# Patient Record
Sex: MALE | Race: WHITE | NOT HISPANIC OR LATINO | Employment: FULL TIME | URBAN - METROPOLITAN AREA
[De-identification: names, ages, dates, MRNs, and addresses within clinical notes are randomized per-mention and may not be internally consistent; named-entity substitution may affect disease eponyms.]

---

## 2021-09-29 NOTE — PROGRESS NOTES
"    Assessment & Plan    1. Encounter to establish care    2. Gastroesophageal reflux disease without esophagitis  - Adult General Surg Referral    3. Insomnia, unspecified type  - SLEEP EVALUATION & MANAGEMENT REFERRAL - ADULT -; Future    4. Impaired fasting glucose  - Hemoglobin A1c; Future  - Hemoglobin A1c    5. Hypertriglyceridemia  - Lipid Profile (Chol, Trig, HDL, LDL calc)    6. Family history of diabetes mellitus  - Hemoglobin A1c; Future  - Comprehensive metabolic panel (BMP + Alb, Alk Phos, ALT, AST, Total. Bili, TP); Future  - Hemoglobin A1c  - Comprehensive metabolic panel (BMP + Alb, Alk Phos, ALT, AST, Total. Bili, TP)    7. Alcoholism (H)  - Lipid Profile (Chol, Trig, HDL, LDL calc)  - Hemoglobin A1c; Future  - Hemoglobin A1c      Review of prior external note(s) from - CareEverywhere information from last family practice visit at [facility] reviewed  Ordering of each unique test  Prescription drug management  25 minutes spent on the date of the encounter doing review of outside records, patient visit and documentation        Tobacco Cessation:   reports that he has been smoking cigarettes. He has smoked for the past 26.00 years. He has never used smokeless tobacco.  Tobacco Cessation Action Plan: Information offered: Patient not interested at this time    BMI:   Estimated body mass index is 29.27 kg/m  as calculated from the following:    Height as of this encounter: 1.778 m (5' 10\").    Weight as of this encounter: 92.5 kg (204 lb).   Weight management plan: Discussed healthy diet and exercise guidelines        Return in about 6 months (around 3/30/2022).    Estelle Reyna, CNP  [facility] [place] is a 41 year old who presents for the following health issues     HPI     New Patient/Transfer of Care    Transferring from [facility]. Previously in [place] before that    Allergic Rhinitis: Did have surgery previously and reports that he does gets fewer flares than " prior to surgery. Has tried Flonase and rinses. Reports he manages this as needed.   -Stable    Asthma- currently at ACT score of 19. Takes Breo daily at night and .  Had a sleep study about 10 years ago and did not need a CPAP. He reports alpha wave intrusion. He reports that he has tried trazodone, lorazepam, and then clonazepam for his sleep. Currently takes amitriptyline for sleep. He is willing to meet with a sleep medicine specialist.     BP: Has had slight elevations. Diastolic levels are usually in the 80's per a brief chart review.     Acid Reflux: Has prevacid for 5-6 years. Has not had any testing. Report hx of duodenitis about 2 years ago. He is interested in having an EGD.       Review of Systems   Constitutional, HEENT, cardiovascular, pulmonary, gi and gu systems are negative, except as otherwise noted.    Full review of current medications, active problem list, and histories reviewed with patient be me.  Patient Active Problem List   Diagnosis     Alcoholism (H)     Allergic rhinitis     Asthma     Hypertriglyceridemia     Impaired fasting glucose     Insomnia     Tobacco abuse     Past Surgical History:   Procedure Laterality Date     SINUS SURGERY       VASECTOMY         Social History     Tobacco Use     Smoking status: Current Every Day Smoker     Years: 26.00     Types: Cigarettes     Smokeless tobacco: Never Used   Substance Use Topics     Alcohol use: Yes     Alcohol/week: 6.0 standard drinks     Types: 6 Cans of beer per week     Comment: weekly     Family History   Problem Relation Age of Onset     Alcoholism Mother      Depression Mother      Mental Illness Mother      Hearing Loss Father      Depression Sister            Current Outpatient Medications   Medication Sig Dispense Refill     albuterol (PROAIR HFA/PROVENTIL HFA/VENTOLIN HFA) 108 (90 Base) MCG/ACT inhaler Inhale 2 puffs into the lungs       amitriptyline (ELAVIL) 10 MG tablet Take 10-20 mg by mouth nightly as needed       BREO  "ELLIPTA 100-25 MCG/INH inhaler INHALE 1 PUFF INTO THE LUNGS EVERY DAY. RINSE MOUTH AFTER USE       famotidine (PEPCID) 40 MG tablet Take 40 mg by mouth       LANsoprazole (PREVACID) 30 MG DR capsule TK ONE C PO QD. TK BEFORE MEALS. DO NOT CRUSH       triamcinolone (KENALOG) 0.1 % external cream        No Known Allergies                Objective    /76 (BP Location: Right arm, Patient Position: Sitting, Cuff Size: Adult Large)   Pulse 88   Temp 98.5  F (36.9  C) (Tympanic)   Resp 16   Ht 1.778 m (5' 10\")   Wt 92.5 kg (204 lb)   SpO2 94%   BMI 29.27 kg/m    Body mass index is 29.27 kg/m .     Physical Exam   GENERAL: healthy, alert and no distress  EYES: Eyes grossly normal to inspection, PERRL and conjunctivae and sclerae normal  HENT: ear canals and TM's normal, nose and mouth without ulcers or lesions  NECK: no adenopathy, no asymmetry, masses, or scars and thyroid normal to palpation  RESP: lungs clear to auscultation - no rales, rhonchi or wheezes  CV: regular rate and rhythm, normal S1 S2, no S3 or S4, no murmur, click or rub, no peripheral edema and peripheral pulses strong  MS: no gross musculoskeletal defects noted, no edema  NEURO: Normal strength and tone, mentation intact and speech normal  PSYCH: mentation appears normal, affect normal/bright      Results for orders placed or performed in visit on 09/30/21   Lipid Profile (Chol, Trig, HDL, LDL calc)     Status: Abnormal   Result Value Ref Range    Cholesterol 201 (H) <200 mg/dL    Triglycerides 164 (H) <150 mg/dL    Direct Measure HDL 43 >=40 mg/dL    LDL Cholesterol Calculated 125 (H) <=100 mg/dL    Non HDL Cholesterol 158 (H) <130 mg/dL    Patient Fasting > 8hrs? Yes     Narrative    Cholesterol  Desirable:  <200 mg/dL    Triglycerides  Normal:  Less than 150 mg/dL  Borderline High:  150-199 mg/dL  High:  200-499 mg/dL  Very High:  Greater than or equal to 500 mg/dL    Direct Measure HDL  Female:  Greater than or equal to 50 mg/dL   Male:  " Greater than or equal to 40 mg/dL    LDL Cholesterol  Desirable:  <100mg/dL  Above Desirable:  100-129 mg/dL   Borderline High:  130-159 mg/dL   High:  160-189 mg/dL   Very High:  >= 190 mg/dL    Non HDL Cholesterol  Desirable:  130 mg/dL  Above Desirable:  130-159 mg/dL  Borderline High:  160-189 mg/dL  High:  190-219 mg/dL  Very High:  Greater than or equal to 220 mg/dL   Hemoglobin A1c     Status: Abnormal   Result Value Ref Range    Estimated Average Glucose 117 mg/dL    Hemoglobin A1C 5.7 (H) 0.0 - 5.6 %   Comprehensive metabolic panel (BMP + Alb, Alk Phos, ALT, AST, Total. Bili, TP)     Status: Abnormal   Result Value Ref Range    Sodium 138 133 - 144 mmol/L    Potassium 4.2 3.4 - 5.3 mmol/L    Chloride 106 94 - 109 mmol/L    Carbon Dioxide (CO2) 28 20 - 32 mmol/L    Anion Gap 4 3 - 14 mmol/L    Urea Nitrogen 15 7 - 30 mg/dL    Creatinine 1.25 0.66 - 1.25 mg/dL    Calcium 9.5 8.5 - 10.1 mg/dL    Glucose 109 (H) 70 - 99 mg/dL    Alkaline Phosphatase 68 40 - 150 U/L    AST 50 (H) 0 - 45 U/L    ALT 89 (H) 0 - 70 U/L    Protein Total 7.5 6.8 - 8.8 g/dL    Albumin 4.1 3.4 - 5.0 g/dL    Bilirubin Total 0.6 0.2 - 1.3 mg/dL    GFR Estimate 71 >60 mL/min/1.73m2

## 2021-09-30 ENCOUNTER — OFFICE VISIT (OUTPATIENT)
Dept: FAMILY MEDICINE | Facility: OTHER | Age: 41
End: 2021-09-30
Attending: NURSE PRACTITIONER
Payer: COMMERCIAL

## 2021-09-30 VITALS
HEART RATE: 88 BPM | WEIGHT: 204 LBS | BODY MASS INDEX: 29.2 KG/M2 | SYSTOLIC BLOOD PRESSURE: 136 MMHG | OXYGEN SATURATION: 94 % | HEIGHT: 70 IN | TEMPERATURE: 98.5 F | RESPIRATION RATE: 16 BRPM | DIASTOLIC BLOOD PRESSURE: 76 MMHG

## 2021-09-30 DIAGNOSIS — G47.00 INSOMNIA, UNSPECIFIED TYPE: ICD-10-CM

## 2021-09-30 DIAGNOSIS — F10.20 ALCOHOLISM (H): ICD-10-CM

## 2021-09-30 DIAGNOSIS — Z76.89 ENCOUNTER TO ESTABLISH CARE: ICD-10-CM

## 2021-09-30 DIAGNOSIS — E78.1 HYPERTRIGLYCERIDEMIA: ICD-10-CM

## 2021-09-30 DIAGNOSIS — Z83.3 FAMILY HISTORY OF DIABETES MELLITUS: Primary | ICD-10-CM

## 2021-09-30 DIAGNOSIS — K21.9 GASTROESOPHAGEAL REFLUX DISEASE WITHOUT ESOPHAGITIS: ICD-10-CM

## 2021-09-30 DIAGNOSIS — R73.01 IMPAIRED FASTING GLUCOSE: ICD-10-CM

## 2021-09-30 PROBLEM — Z72.0 TOBACCO ABUSE: Status: ACTIVE | Noted: 2020-07-08

## 2021-09-30 LAB
ALBUMIN SERPL-MCNC: 4.1 G/DL (ref 3.4–5)
ALP SERPL-CCNC: 68 U/L (ref 40–150)
ALT SERPL W P-5'-P-CCNC: 89 U/L (ref 0–70)
ANION GAP SERPL CALCULATED.3IONS-SCNC: 4 MMOL/L (ref 3–14)
AST SERPL W P-5'-P-CCNC: 50 U/L (ref 0–45)
BILIRUB SERPL-MCNC: 0.6 MG/DL (ref 0.2–1.3)
BUN SERPL-MCNC: 15 MG/DL (ref 7–30)
CALCIUM SERPL-MCNC: 9.5 MG/DL (ref 8.5–10.1)
CHLORIDE BLD-SCNC: 106 MMOL/L (ref 94–109)
CHOLEST SERPL-MCNC: 201 MG/DL
CO2 SERPL-SCNC: 28 MMOL/L (ref 20–32)
CREAT SERPL-MCNC: 1.25 MG/DL (ref 0.66–1.25)
EST. AVERAGE GLUCOSE BLD GHB EST-MCNC: 117 MG/DL
FASTING STATUS PATIENT QL REPORTED: YES
GFR SERPL CREATININE-BSD FRML MDRD: 71 ML/MIN/1.73M2
GLUCOSE BLD-MCNC: 109 MG/DL (ref 70–99)
HBA1C MFR BLD: 5.7 % (ref 0–5.6)
HDLC SERPL-MCNC: 43 MG/DL
LDLC SERPL CALC-MCNC: 125 MG/DL
NONHDLC SERPL-MCNC: 158 MG/DL
POTASSIUM BLD-SCNC: 4.2 MMOL/L (ref 3.4–5.3)
PROT SERPL-MCNC: 7.5 G/DL (ref 6.8–8.8)
SODIUM SERPL-SCNC: 138 MMOL/L (ref 133–144)
TRIGL SERPL-MCNC: 164 MG/DL

## 2021-09-30 PROCEDURE — 80061 LIPID PANEL: CPT | Performed by: NURSE PRACTITIONER

## 2021-09-30 PROCEDURE — 83036 HEMOGLOBIN GLYCOSYLATED A1C: CPT | Performed by: NURSE PRACTITIONER

## 2021-09-30 PROCEDURE — 80053 COMPREHEN METABOLIC PANEL: CPT | Performed by: NURSE PRACTITIONER

## 2021-09-30 PROCEDURE — 36415 COLL VENOUS BLD VENIPUNCTURE: CPT | Performed by: NURSE PRACTITIONER

## 2021-09-30 PROCEDURE — 99204 OFFICE O/P NEW MOD 45 MIN: CPT | Performed by: NURSE PRACTITIONER

## 2021-09-30 RX ORDER — AMITRIPTYLINE HYDROCHLORIDE 10 MG/1
10-20 TABLET ORAL
COMMUNITY
Start: 2021-09-22 | End: 2021-10-19

## 2021-09-30 RX ORDER — LANSOPRAZOLE 30 MG/1
CAPSULE, DELAYED RELEASE ORAL
COMMUNITY
Start: 2020-10-20 | End: 2021-10-19

## 2021-09-30 RX ORDER — ALBUTEROL SULFATE 90 UG/1
2 AEROSOL, METERED RESPIRATORY (INHALATION)
COMMUNITY
Start: 2021-07-02 | End: 2022-07-13

## 2021-09-30 RX ORDER — FAMOTIDINE 40 MG/1
40 TABLET, FILM COATED ORAL
COMMUNITY
Start: 2020-07-07 | End: 2021-11-12 | Stop reason: ALTCHOICE

## 2021-09-30 RX ORDER — TRIAMCINOLONE ACETONIDE 1 MG/G
CREAM TOPICAL
COMMUNITY
Start: 2021-06-30 | End: 2023-04-14

## 2021-09-30 ASSESSMENT — ANXIETY QUESTIONNAIRES
GAD7 TOTAL SCORE: 3
6. BECOMING EASILY ANNOYED OR IRRITABLE: NOT AT ALL
IF YOU CHECKED OFF ANY PROBLEMS ON THIS QUESTIONNAIRE, HOW DIFFICULT HAVE THESE PROBLEMS MADE IT FOR YOU TO DO YOUR WORK, TAKE CARE OF THINGS AT HOME, OR GET ALONG WITH OTHER PEOPLE: NOT DIFFICULT AT ALL
1. FEELING NERVOUS, ANXIOUS, OR ON EDGE: SEVERAL DAYS
5. BEING SO RESTLESS THAT IT IS HARD TO SIT STILL: SEVERAL DAYS
3. WORRYING TOO MUCH ABOUT DIFFERENT THINGS: NOT AT ALL
7. FEELING AFRAID AS IF SOMETHING AWFUL MIGHT HAPPEN: NOT AT ALL
2. NOT BEING ABLE TO STOP OR CONTROL WORRYING: NOT AT ALL

## 2021-09-30 ASSESSMENT — PATIENT HEALTH QUESTIONNAIRE - PHQ9
5. POOR APPETITE OR OVEREATING: SEVERAL DAYS
SUM OF ALL RESPONSES TO PHQ QUESTIONS 1-9: 3

## 2021-09-30 ASSESSMENT — MIFFLIN-ST. JEOR: SCORE: 1831.59

## 2021-09-30 ASSESSMENT — PAIN SCALES - GENERAL: PAINLEVEL: NO PAIN (0)

## 2021-09-30 NOTE — LETTER
My Asthma Action Plan    Name: Solis Prescott   YOB: 1980  Date: 9/30/2021   My doctor: Estelle Reyna CNP   My clinic: [facility]        My Control Medicine: BREO  My Rescue Medicine: Albuterol (Proair/Ventolin/Proventil HFA) 2-4 puffs EVERY 4 HOURS as needed. Use a spacer if recommended by your provider. My Asthma Severity:   Mild Persistent  Know your asthma triggers: SCENTS, HUMIDITY, COLD               GREEN ZONE   Good Control    I feel good    No cough or wheeze    Can work, sleep and play without asthma symptoms       Take your asthma control medicine every day.     1. If exercise triggers your asthma, take your rescue medication    15 minutes before exercise or sports, and    During exercise if you have asthma symptoms  2. Spacer to use with inhaler: If you have a spacer, make sure to use it with your inhaler             YELLOW ZONE Getting Worse  I have ANY of these:    I do not feel good    Cough or wheeze    Chest feels tight    Wake up at night   1. Keep taking your Green Zone medications  2. Start taking your rescue medicine:    every 20 minutes for up to 1 hour. Then every 4 hours for 24-48 hours.  3. If you stay in the Yellow Zone for more than 12-24 hours, contact your doctor.  4. If you do not return to the Green Zone in 12-24 hours or you get worse, start taking your oral steroid medicine if prescribed by your provider.           RED ZONE Medical Alert - Get Help  I have ANY of these:    I feel awful    Medicine is not helping    Breathing getting harder    Trouble walking or talking    Nose opens wide to breathe       1. Take your rescue medicine NOW  2. If your provider has prescribed an oral steroid medicine, start taking it NOW  3. Call your doctor NOW  4. If you are still in the Red Zone after 20 minutes and you have not reached your doctor:    Take your rescue medicine again and    Call 911 or go to the emergency room right away    See your regular  doctor within 2 weeks of an Emergency Room or Urgent Care visit for follow-up treatment.          Annual Reminders:  Meet with Asthma Educator,  Flu Shot in the Fall, consider Pneumonia Vaccination for patients with asthma (aged 19 and older).    Pharmacy: [id number] [place] [phone] [place] [address] AT [facility] [address]    Electronically signed by Estelle Reyna CNP   Date: 09/30/21                      Asthma Triggers  How To Control Things That Make Your Asthma Worse    Triggers are things that make your asthma worse.  Look at the list below to help you find your triggers and what you can do about them.  You can help prevent asthma flare-ups by staying away from your triggers.      Trigger                                                          What you can do   Cigarette Smoke  Tobacco smoke can make asthma worse. Do not allow smoking in your home, car or around you.  Be sure no one smokes at a child s day care or school.  If you smoke, ask your health care provider for ways to help you quit.  Ask family members to quit too.  Ask your health care provider for a referral to Quit Plan to help you quit smoking, or call 6-017-825-PLAN.     Colds, Flu, Bronchitis  These are common triggers of asthma. Wash your hands often.  Don t touch your eyes, nose or mouth.  Get a flu shot every year.     Dust Mites  These are tiny bugs that live in cloth or carpet. They are too small to see. Wash sheets and blankets in hot water every week.   Encase pillows and mattress in dust mite proof covers.  Avoid having carpet if you can. If you have carpet, vacuum weekly.   Use a dust mask and HEPA vacuum.   Pollen and Outdoor Mold  Some people are allergic to trees, grass, or weed pollen, or molds. Try to keep your windows closed.  Limit time out doors when pollen count is high.   Ask you health care provider about taking medicine during allergy season.     Animal Dander  Some people are allergic to skin  flakes, urine or saliva from pets with fur or feathers. Keep pets with fur or feathers out of your home.    If you can t keep the pet outdoors, then keep the pet out of your bedroom.  Keep the bedroom door closed.  Keep pets off cloth furniture and away from stuffed toys.     Mice, Rats, and Cockroaches   Some people are allergic to the waste from these pests.   Cover food and garbage.  Clean up spills and food crumbs.  Store grease in the refrigerator.   Keep food out of the bedroom.   Indoor Mold  This can be a trigger if your home has high moisture. Fix leaking faucets, pipes, or other sources of water.   Clean moldy surfaces.  Dehumidify basement if it is damp and smelly.   Smoke, Strong Odors, and Sprays  These can reduce air quality. Stay away from strong odors and sprays, such as perfume, powder, hair spray, paints, smoke incense, paint, cleaning products, candles and new carpet.   Exercise or Sports  Some people with asthma have this trigger. Be active!  Ask your doctor about taking medicine before sports or exercise to prevent symptoms.    Warm up for 5-10 minutes before and after sports or exercise.     Other Triggers of Asthma  Cold air:  Cover your nose and mouth with a scarf.  Sometimes laughing or crying can be a trigger.  Some medicines and food can trigger asthma.

## 2021-09-30 NOTE — PATIENT INSTRUCTIONS
Patient Education     Prevention Guidelines, Men Ages 40 to 49  Screening tests and vaccines are an important part of managing your health. A screening test is done to find possible disorders or diseases in people who don't have any symptoms. The goal is to find a disease early so lifestyle changes can be made and you can be watched more closely to reduce the risk of disease, or to detect it early enough to treat it most effectively. Screening tests are not considered diagnostic, but are used to determine if more testing is needed. Health counseling is essential, too. Below are guidelines for these, for men ages 40 to 49. Talk with your healthcare provider to make sure you re up to date on what you need.  Screening Who needs it How often   Alcohol misuse All men in this age group At routine exams   Blood pressure All men in this age group Yearly checkup if your blood pressure reading is normal  Normal blood pressure is less than 120/80 mm Hg  If your blood pressure is higher than normal, follow the advice of your healthcare provider      Depression All men in this age group At routine exams   Type 2 diabetes or prediabetes All men beginning at age 45 and men  without symptoms at any age who are overweight or obese and have 1 or more other risk factors for diabetes At least every 3 years (yearly if blood sugar has begun to rise)   Type 2 diabetes All men with prediabetes Every year   Hepatitis C Men at increased risk for infection - talk with your healthcare provider At routine exams   High cholesterol or triglycerides All men ages 35 and older, and younger men at high risk for coronary artery disease At least every 5 years   HIV All men At routine exams   Obesity All men in this age group At routine exams   Prostate cancer Starting at age 45, talk to healthcare provider about risks and benefits of digital rectal exam (TRANG) and prostate-specific antigen (PSA) screening1 At routine exams   Syphilis Men at increased  risk for infection - talk with your healthcare provider At routine exams   Tuberculosis Men at increased risk for infection - talk with your healthcare provider Check with your healthcare provider   Vision All men in this age group Every 2 to 4 years if no risk factors for eye disease2   Vaccine Who needs it How often   Chickenpox (varicella) All men in this age group who have no record of this infection or vaccine 2 doses; the second dose should be given at least 4 weeks after the first dose   Hepatitis A Men at increased risk for infection - talk with your healthcare provider 2 doses given at least 6 months apart   Hepatitis B Men at increased risk for infection - talk with your healthcare provider 3 doses over 6 months; second dose should be given 1 month after the first dose; the third dose should be given at least 2 months after the second dose and at least 4 months after the first dose   Haemophilus influenzae Type B (HIB) Men at increased risk for infection - talk with your healthcare provider 1 to 3 doses   Influenza (flu) All men in this age group Once a year   Measles, mumps, rubella (MMR) All men in this age group who have no record of these infections or vaccines 1 or 2 doses   Meningococcal Men at increased risk for infection - talk with your healthcare provider 1 or more doses   Pneumococcal conjugate vaccine (PCV13) and pneumococcal polysaccharide vaccine (PPSV23) Men at increased risk for infection - talk with your healthcare provider PCV13: 1 dose ages 19 to 65 (protects against 13 types of pneumococcal bacteria)     PPSV23: 1 to 2 doses through age 64, or 1 dose at 65 or older (protects against 23 types of pneumococcal bacteria)      Tetanus/diphtheria/  pertussis (Td/Tdap) booster All men in this age group Td every 10 years, or a one-time dose of Tdap instead of a Td booster after age 18, then Td every 10 years   Counseling Who needs it How often   Diet and exercise Men who are overweight or obese  When diagnosed, and then at routine exams   Sexually transmitted infection prevention Men at increased risk for infection - talk with your healthcare provider At routine exams   Use of daily aspirin Men ages 45 to 79 at risk for cardiovascular health problems At routine exams   Use of tobacco and the health effects it can cause All men in this age group Every exam   [address] [place] [address] [facility]   [facility] [employer]  Kristi last reviewed this educational content on 2/1/2017 2000-2021 [employer]. All rights reserved. This information is not intended as a substitute for professional medical care. Always follow your healthcare professional's instructions.

## 2021-09-30 NOTE — NURSING NOTE
"Chief Complaint   Patient presents with     Establish Care       Initial /76 (BP Location: Right arm, Patient Position: Sitting, Cuff Size: Adult Large)   Pulse 88   Temp 98.5  F (36.9  C) (Tympanic)   Resp 16   Ht 1.778 m (5' 10\")   Wt 92.5 kg (204 lb)   SpO2 94%   BMI 29.27 kg/m   Estimated body mass index is 29.27 kg/m  as calculated from the following:    Height as of this encounter: 1.778 m (5' 10\").    Weight as of this encounter: 92.5 kg (204 lb).  Medication Reconciliation: complete  Whit Mtz LPN    "

## 2021-09-30 NOTE — RESULT ENCOUNTER NOTE
Lifestyle changes recommended to lower cholesterol levels. 5-7% weight loss with reduced calorie intake is generally recommended to start. Mediterranean diet is a great place to start.     Reduce alcohol intake as there is some elevation of AST and ALT consistent with alcohol related stress on the liver.      The 10-year ASCVD risk score (Manan ALCARAZ Jr., et al., 2013) is: 6%    Values used to calculate the score:      Age: 41 years      Sex: Male      Is Non- : No      Diabetic: No      Tobacco smoker: Yes      Systolic Blood Pressure: 136 mmHg      Is BP treated: No      HDL Cholesterol: 43 mg/dL      Total Cholesterol: 201 mg/dL

## 2021-10-01 ASSESSMENT — ASTHMA QUESTIONNAIRES: ACT_TOTALSCORE: 19

## 2021-10-01 ASSESSMENT — ANXIETY QUESTIONNAIRES: GAD7 TOTAL SCORE: 3

## 2021-10-06 ENCOUNTER — OFFICE VISIT (OUTPATIENT)
Dept: SURGERY | Facility: OTHER | Age: 41
End: 2021-10-06
Attending: NURSE PRACTITIONER
Payer: COMMERCIAL

## 2021-10-06 ENCOUNTER — PREP FOR PROCEDURE (OUTPATIENT)
Dept: SURGERY | Facility: OTHER | Age: 41
End: 2021-10-06

## 2021-10-06 VITALS
DIASTOLIC BLOOD PRESSURE: 92 MMHG | SYSTOLIC BLOOD PRESSURE: 128 MMHG | OXYGEN SATURATION: 96 % | TEMPERATURE: 98.2 F | BODY MASS INDEX: 29.41 KG/M2 | WEIGHT: 205.4 LBS | RESPIRATION RATE: 16 BRPM | HEIGHT: 70 IN | HEART RATE: 70 BPM

## 2021-10-06 DIAGNOSIS — Z01.818 PREOP TESTING: Primary | ICD-10-CM

## 2021-10-06 DIAGNOSIS — K21.9 GASTROESOPHAGEAL REFLUX DISEASE WITHOUT ESOPHAGITIS: ICD-10-CM

## 2021-10-06 DIAGNOSIS — K21.00 GASTROESOPHAGEAL REFLUX DISEASE WITH ESOPHAGITIS: Primary | ICD-10-CM

## 2021-10-06 PROCEDURE — 99203 OFFICE O/P NEW LOW 30 MIN: CPT | Performed by: SURGERY

## 2021-10-06 ASSESSMENT — MIFFLIN-ST. JEOR: SCORE: 1837.94

## 2021-10-06 ASSESSMENT — PAIN SCALES - GENERAL: PAINLEVEL: NO PAIN (0)

## 2021-10-06 NOTE — NURSING NOTE
"Chief Complaint   Patient presents with     Consult     GERD without esophagitis       Initial BP (!) 128/92 (BP Location: Right arm, Patient Position: Chair, Cuff Size: Adult Regular)   Pulse 70   Temp 98.2  F (36.8  C) (Tympanic)   Resp 16   Ht 1.778 m (5' 10\")   Wt 93.2 kg (205 lb 6.4 oz)   SpO2 96%   BMI 29.47 kg/m   Estimated body mass index is 29.47 kg/m  as calculated from the following:    Height as of this encounter: 1.778 m (5' 10\").    Weight as of this encounter: 93.2 kg (205 lb 6.4 oz).  Medication Reconciliation: complete  Daisy Gibson LPN    "

## 2021-10-06 NOTE — PATIENT INSTRUCTIONS
Thank you for allowing our surgical team to participate in your care. Please review the following instructions to prepare for your upcoming Upper Endoscopy. You may call any of the numbers listed below with questions you may have.  [facility] Unit Coordinator: [phone]  Clinic Surgery Nurse (Daisy): [phone]  Surgery Education Nurse: [phone]    Date of Procedure: 12/16/21 with Dr. Lynn  Admit time: Surgery  will call you the day before your procedure by 5pm with your admit time. If your surgery is on Monday, please expect a call on Friday. If we were unable to reach you by 5PM, you may call  [phone] for your arrival time.    COVID-19 test is needed 4 days before procedure. This testing is done at the upper level of [facility] (weekdays and weekends-park at East Entrance) or at the [facility] (weekday mornings only).  Follow the signage for parking and bring your mobile phone (if you have one) to call the phone number ([phone]) on the sign outside the testing site for check-in. Stay in your vehicle until you are directed to enter. If you do not have a cell phone, please call the nurse for instructions on checking in. This has been scheduled for 12/12/21 at 11:00 at the [facility] site.      Please call the Surgery Education Nurses 1 week prior to your surgery date at  [phone] for further instructions. Have your medication/allergy lists ready.    Do not take Aspirin (325mg), other NSAIDs (Ibuprofen, Motrin, Aleve, Celebrex, Naproxen, etc.) vitamins/supplements 7 days before your surgery.   If you are on blood thinners or insulin, please call your primary care provider for instruction. If you are prescribed an 81 mg Aspirin, you may continue.    Please call the clinic surgery nurse or your regular doctor if you get sick within 2 weeks of the procedure. (vomiting, diarrhea, fever, cough, cold or any other symptoms of illness)    Do not eat any solid foods or  milk products after 10pm the night prior to surgery.   You may have clear liquids only up until 4 hours prior to surgery.   Please see the table at the end of instructions for a list of clear liquids.     You will need a responsible adult available to drive you home and stay with you for at least 4 hours after you leave the hospital. You will not be allowed to drive yourself. If you need to take a taxi or the bus you must have a responsible person to ride with you (not the taxi/). Your procedure will be cancelled if you do not bring a responsible adult.    Questions or concerns can be directed to the clinic or surgery education nurse at any of the numbers listed above. If you have a scheduling or appointment question, please call the Health Unit Coordinator between 8am and 4pm Monday through Friday. After hours or on weekends, please call [phone] to postpone.         Clear Liquid Diet  You may have: Do NOT have:     ? Tea, coffee (no cream)   Milk or milk products such as ice cream, malts or shakes     ? Water, Vitamin Water, Smart Water, Coconut Water, PowerAde, Propel, Soda pop, (Sprite, 7 UP, Ginger Ale, Gatorade (not red or purple)   Red or purple drinks of any kind such as  Cranberry juice or grape juice.     ? Clear nutrition drinks (Resource Breeze, Ensure Active protein drink (peach flavor)   Red or purple Jell-O, Popsicles, Hair-Aid, Sorbet and candy.     ? Jell-O, Popsicles (no milk or fruit pieces) or Italian ice (not red or purple)   Juices with pulp such as orange, grapefruit, pineapple or tomato juice     ? Water, Vitamin Water, Smart Water, Coconut Water   Cream soups of any kind     ? Fat-free soup broth or bouillon   Alcohol     ? Plain hard candy, such as clear Life Savers (not red or purple)      ? Powdered Lemonade such as Crystal Light, Country Time      ? Clear juices and fruit-flavored drinks such as apple juice, white grape juice, Hi-C and Hair-Aid (not red or purple)      ? Honey  / Sugar

## 2021-10-06 NOTE — PROGRESS NOTES
CLINIC NOTE - CONSULT  10/6/2021    Patient : Solis Prescott  Referring Physician : Estelle Reyna    Reason for Referral : Upper endoscopy    This is a 41 year old male with a need for an upper endoscopy.  Upper endoscopy is needed for GERD.      Last EGD : never  History of GERD : YES  History of PUD : NO  On PPI's : YES   Drug and Dose : Prevacid 30 mg a day with Pepcid for breakthrough   If on H2 blockers or PPI's, have they helped:  yes  History of dysphagia : NO   Dysphagia to solids greater than liquids : Not Applicable  Hematemesis : NO  Melena : NO    Past Medical History:  No past medical history on file.    Past Surgical History:  Past Surgical History:   Procedure Laterality Date     SINUS SURGERY       VASECTOMY         Family History History:  Family History   Problem Relation Age of Onset     Alcoholism Mother      Depression Mother      Mental Illness Mother      Hearing Loss Father      Depression Sister        History of Tobacco Use:  History   Smoking Status     Current Every Day Smoker     Years: 26.00     Types: Cigarettes   Smokeless Tobacco     Never Used       Current Medications:  Current Outpatient Medications   Medication Sig Dispense Refill     albuterol (PROAIR HFA/PROVENTIL HFA/VENTOLIN HFA) 108 (90 Base) MCG/ACT inhaler Inhale 2 puffs into the lungs       amitriptyline (ELAVIL) 10 MG tablet Take 10-20 mg by mouth nightly as needed       BREO ELLIPTA 100-25 MCG/INH inhaler INHALE 1 PUFF INTO THE LUNGS EVERY DAY. RINSE MOUTH AFTER USE       famotidine (PEPCID) 40 MG tablet Take 40 mg by mouth       LANsoprazole (PREVACID) 30 MG DR capsule TK ONE C PO QD. TK BEFORE MEALS. DO NOT CRUSH       triamcinolone (KENALOG) 0.1 % external cream          Allergies:  No Known Allergies    ROS:  Pertinent items are noted in HPI.  All other systems are negative.    PHYSICAL EXAM:     Vital signs: BP (!) 128/92 (BP Location: Right arm, Patient Position: Chair, Cuff Size: Adult Regular)   Pulse 70    "Temp 98.2  F (36.8  C) (Tympanic)   Resp 16   Ht 1.778 m (5' 10\")   Wt 93.2 kg (205 lb 6.4 oz)   SpO2 96%   BMI 29.47 kg/m     Weight: [unfilled]   BMI: Body mass index is 29.47 kg/m .   General: Normal, healthy, cooperative, in no acute distress, alert   Skin: no jaundice   HEENT: PERRLA and EOMI   Neck: supple   Lungs: clear to auscultation   CV: Regular rate and rhythm without murmer   Abdominal: abdomen is soft without significant tenderness, masses, organomegaly or guarding   Extremities: No cyanosis, clubbing or edema noted bilaterally in Upper and Lower Extremities   Neurological: without deficit    Assessment:   41 year old male with need for upper endoscopy for gerd:    Plan:   Will schedule an esophagogastroduodenoscopy.  The procedures with their risks, benefits and alternatives were explained.  Risks include but are not limited to bleeding, perforation, missing lesions, need for additional procedures, reaction to anesthesia.  All the patients questions were answered.  The patient consents to proceed.  The procedure will be scheduled.      "

## 2021-10-13 DIAGNOSIS — J45.40 MODERATE PERSISTENT ASTHMA WITHOUT COMPLICATION: Primary | ICD-10-CM

## 2021-10-13 NOTE — TELEPHONE ENCOUNTER
BREO ELLIPTA 100-25 MCG/INH inhaler        Last Written Prescription Date:  unknown  Last Fill Quantity: ,   # refills:   Last Office Visit: 9/30/21  Future Office visit:    Next 5 appointments (look out 90 days)    Dec 12, 2021 11:15 AM  (Arrive by 11:00 AM)  SHORT with [facility] COLLECTION  [facility] [place] ) [address]  [place] [address]  [phone]           Routing refill request to provider for review/approval because:    Medication is reported/historical

## 2021-10-19 ENCOUNTER — OFFICE VISIT (OUTPATIENT)
Dept: FAMILY MEDICINE | Facility: OTHER | Age: 41
End: 2021-10-19
Attending: NURSE PRACTITIONER
Payer: COMMERCIAL

## 2021-10-19 VITALS
HEIGHT: 70 IN | HEART RATE: 86 BPM | SYSTOLIC BLOOD PRESSURE: 138 MMHG | DIASTOLIC BLOOD PRESSURE: 88 MMHG | BODY MASS INDEX: 29.06 KG/M2 | WEIGHT: 203 LBS | TEMPERATURE: 97.7 F | OXYGEN SATURATION: 96 %

## 2021-10-19 DIAGNOSIS — K21.9 GASTROESOPHAGEAL REFLUX DISEASE WITHOUT ESOPHAGITIS: ICD-10-CM

## 2021-10-19 DIAGNOSIS — G47.00 INSOMNIA, UNSPECIFIED TYPE: ICD-10-CM

## 2021-10-19 DIAGNOSIS — J45.40 MODERATE PERSISTENT ASTHMA WITHOUT COMPLICATION: Primary | ICD-10-CM

## 2021-10-19 PROCEDURE — 99213 OFFICE O/P EST LOW 20 MIN: CPT | Performed by: NURSE PRACTITIONER

## 2021-10-19 RX ORDER — LANSOPRAZOLE 30 MG/1
30 CAPSULE, DELAYED RELEASE ORAL
Qty: 30 CAPSULE | Refills: 0 | Status: SHIPPED | OUTPATIENT
Start: 2021-10-19 | End: 2021-11-12 | Stop reason: ALTCHOICE

## 2021-10-19 RX ORDER — MONTELUKAST SODIUM 10 MG/1
10 TABLET ORAL AT BEDTIME
Qty: 30 TABLET | Refills: 0 | Status: SHIPPED | OUTPATIENT
Start: 2021-10-19 | End: 2021-11-15

## 2021-10-19 RX ORDER — AMITRIPTYLINE HYDROCHLORIDE 10 MG/1
10-20 TABLET ORAL
Qty: 30 TABLET | Refills: 0 | Status: SHIPPED | OUTPATIENT
Start: 2021-10-19 | End: 2022-03-29

## 2021-10-19 ASSESSMENT — PATIENT HEALTH QUESTIONNAIRE - PHQ9: SUM OF ALL RESPONSES TO PHQ QUESTIONS 1-9: 10

## 2021-10-19 ASSESSMENT — ASTHMA QUESTIONNAIRES
QUESTION_3 LAST FOUR WEEKS HOW OFTEN DID YOUR ASTHMA SYMPTOMS (WHEEZING, COUGHING, SHORTNESS OF BREATH, CHEST TIGHTNESS OR PAIN) WAKE YOU UP AT NIGHT OR EARLIER THAN USUAL IN THE MORNING: TWO OR THREE NIGHTS A WEEK
ACT_TOTALSCORE: 14
QUESTION_4 LAST FOUR WEEKS HOW OFTEN HAVE YOU USED YOUR RESCUE INHALER OR NEBULIZER MEDICATION (SUCH AS ALBUTEROL): ONE OR TWO TIMES PER DAY
QUESTION_2 LAST FOUR WEEKS HOW OFTEN HAVE YOU HAD SHORTNESS OF BREATH: THREE TO SIX TIMES A WEEK
QUESTION_1 LAST FOUR WEEKS HOW MUCH OF THE TIME DID YOUR ASTHMA KEEP YOU FROM GETTING AS MUCH DONE AT WORK, SCHOOL OR AT HOME: NONE OF THE TIME
QUESTION_5 LAST FOUR WEEKS HOW WOULD YOU RATE YOUR ASTHMA CONTROL: POORLY CONTROLLED

## 2021-10-19 ASSESSMENT — ANXIETY QUESTIONNAIRES
1. FEELING NERVOUS, ANXIOUS, OR ON EDGE: NOT AT ALL
3. WORRYING TOO MUCH ABOUT DIFFERENT THINGS: NOT AT ALL
GAD7 TOTAL SCORE: 1
IF YOU CHECKED OFF ANY PROBLEMS ON THIS QUESTIONNAIRE, HOW DIFFICULT HAVE THESE PROBLEMS MADE IT FOR YOU TO DO YOUR WORK, TAKE CARE OF THINGS AT HOME, OR GET ALONG WITH OTHER PEOPLE: NOT DIFFICULT AT ALL
6. BECOMING EASILY ANNOYED OR IRRITABLE: NOT AT ALL
4. TROUBLE RELAXING: SEVERAL DAYS
2. NOT BEING ABLE TO STOP OR CONTROL WORRYING: NOT AT ALL
5. BEING SO RESTLESS THAT IT IS HARD TO SIT STILL: NOT AT ALL
7. FEELING AFRAID AS IF SOMETHING AWFUL MIGHT HAPPEN: NOT AT ALL

## 2021-10-19 ASSESSMENT — MIFFLIN-ST. JEOR: SCORE: 1827.05

## 2021-10-19 ASSESSMENT — PAIN SCALES - GENERAL: PAINLEVEL: NO PAIN (0)

## 2021-10-19 NOTE — PATIENT INSTRUCTIONS
Patient Education     Montelukast Oral Tablet 10 mg  Uses  This medicine is used for the following purposes:    allergy symptoms    prevent asthma attacks    asthma  Instructions  This medicine may be taken with or without food.  It is very important that you take the medicine at about the same time every day. It will work best if you do this.  Keep the medicine at room temperature. Avoid heat and direct light.  It is important that you keep taking each dose of this medicine on time even if you are feeling well.  If you forget to take a dose on time, take it as soon as you remember. If it is almost time for the next dose, do not take the missed dose. Return to your normal dosing schedule. Do not take 2 doses of this medicine at one time.  Please tell your doctor and pharmacist about all the medicines you take. Include both prescription and over-the-counter medicines. Also tell them about any vitamins, herbal medicines, or anything else you take for your health.  This medicine will not stop an asthma attack once it has started. If you have an inhaler to stop an asthma attack, be sure to always carry that inhaler with you to use during an asthma attack.  Do not suddenly stop taking this medicine. Check with your doctor before stopping.  Cautions  Tell your doctor and pharmacist if you ever had an allergic reaction to a medicine. Symptoms of an allergic reaction can include trouble breathing, skin rash, itching, swelling, or severe dizziness.  Some patients taking this medicine have experienced serious side effects. Please speak with your doctor to understand the risks and benefits associated with this medicine.  Do not use the medication any more than instructed.  Tell the doctor or pharmacist if you are pregnant, planning to be pregnant, or breastfeeding.  Ask your pharmacist if this medicine can interact with any of your other medicines. Be sure to tell them about all the medicines you take.  Do not start or stop  any other medicines without first speaking to your doctor or pharmacist.  Do not share this medicine with anyone who has not been prescribed this medicine.  This medicine can cause serious side effects in some patients. Important information from the U[place] Food and Drug Administration (FDA) is available from your pharmacist. Please review it carefully with your pharmacist to understand the risks associated with this medicine.  Side Effects  Call your doctor or get medical help right away if you notice any of these more serious side effects:    agitated feeling or trouble sleeping    depression or feeling sad    bad dreams    hallucinations (unusual thoughts, seeing or hearing things that are not real)    mood changes    muscle aches, spasms or abnormal movements    unusual behavior during sleep such as walking, talking, cooking, eating, making phone calls or driving    stuttering    suicidal thoughts  A few people may have an allergic reactions to this medicine. Symptoms can include difficulty breathing, skin rash, itching, swelling, or severe dizziness. If you notice any of these symptoms, seek medical help quickly.  Extra  Please speak with your doctor, nurse, or pharmacist if you have any questions about this medicine.  [email or web address]  IMPORTANT NOTE: This document tells you briefly how to take your medicine, but it does not tell you all there is to know about it.Your doctor or pharmacist may give you other documents about your medicine. Please talk to them if you have any questions.Always follow their advice. There is a more complete description of this medicine available in English.Scan this code on your smartphone or tablet or use the web address below. You can also ask your pharmacist for a printout. If you have any questions, please ask your pharmacist.     2021 [employer].

## 2021-10-19 NOTE — PROGRESS NOTES
Assessment & Plan     Moderate persistent asthma without complication  ACT has declined slightly from 19 to 14. Reports frequent asthma flares in the fall. Continue Breo and albuterol. Add montelukast nightly. 30 day supply given due to patient request for insurance purposes. However, if tolerating and working well, we will continue long term.   Follow up 1 year and ACTs as they come due. Follow up sooner if asthma not controlled (subjective or score below 20 for ACT)   - montelukast (SINGULAIR) 10 MG tablet; Take 1 tablet (10 mg) by mouth At Bedtime    Prescription drug management    No follow-ups on file.    Estelle Reyna, [facility] [place] is a 41 year old who presents for the following health issues     HPI     Asthma Follow-Up    Was ACT completed today?    Yes    ACT Total Scores 10/19/2021   ACT TOTAL SCORE (Goal Greater than or Equal to 20) 14   In the past 12 months, how many times did you visit the emergency room for your asthma without being admitted to the hospital? 0   In the past 12 months, how many times were you hospitalized overnight because of your asthma? 0         How many days per week do you miss taking your asthma controller medication?  0    Please describe any recent triggers for your asthma: strong odors and fumes    Have you had any Emergency Room Visits, Urgent Care Visits, or Hospital Admissions since your last office visit?  No      How many servings of fruits and vegetables do you eat daily?  2-3    On average, how many sweetened beverages do you drink each day (Examples: soda, juice, sweet tea, etc.  Do NOT count diet or artificially sweetened beverages)?   5    How many days per week do you exercise enough to make your heart beat faster? 3 or less    How many minutes a day do you exercise enough to make your heart beat faster? 9 or less    How many days per week do you miss taking your medication? 0    Insomnia  - Meeting with sleep  "medicine to address this.   Onset/Duration: years  Description:   Frequency of insomnia:  nightly  Time to fall asleep (sleep latency): some night right away other a long time   Middle of night awakening:  YES- occasionally   Early morning awakening:  YES  Progression of Symptoms:  worsening  Accompanying Signs & Symptoms:  Daytime sleepiness/napping: YES  Excessive snoring/apnea: no  Restless legs: no  Waking to urinate: no  Chronic pain:  no  Depression symptoms (if yes, do PHQ9): no  Anxiety symptoms (if yes, do JOHN-7): YES  History:  Prior Insomnia: YES  New stressful situation: no  Precipitating factors:   Caffeine intake: YES  OTC decongestants: no  Any new medications: no  Alleviating factors:  Self medicating (alcohol, etc.):  no  Stress-reduction (exercise, yoga, meditation etc): no  Therapies tried and outcome: Elavil     The 10-year ASCVD risk score (Manan ALCARAZ Jr., et al., 2013) is: 6.1%    Values used to calculate the score:      Age: 41 years      Sex: Male      Is Non- : No      Diabetic: No      Tobacco smoker: Yes      Systolic Blood Pressure: 138 mmHg      Is BP treated: No      HDL Cholesterol: 43 mg/dL      Total Cholesterol: 201 mg/dL        Review of Systems   Constitutional, HEENT, cardiovascular, pulmonary, gi and gu systems are negative, except as otherwise noted.      Objective    /88 (BP Location: Right arm, Patient Position: Chair, Cuff Size: Adult Regular)   Pulse 86   Temp 97.7  F (36.5  C) (Tympanic)   Ht 1.778 m (5' 10\")   Wt 92.1 kg (203 lb)   SpO2 96%   BMI 29.13 kg/m    Body mass index is 29.13 kg/m .     Physical Exam   GENERAL: healthy, alert and no distress  EYES: Eyes grossly normal to inspection, PERRL and conjunctivae and sclerae normal  HENT: ear canals and TM's normal, nose and mouth without ulcers or lesions  NECK: no adenopathy, no asymmetry, masses, or scars and thyroid normal to palpation  RESP: lungs clear to auscultation - no rales, " rhonchi or wheezes  CV: regular rate and rhythm, normal S1 S2, no S3 or S4, no murmur, click or rub, no peripheral edema and peripheral pulses strong

## 2021-10-19 NOTE — NURSING NOTE
"Chief Complaint   Patient presents with     Asthma     Insomnia       Initial /88 (BP Location: Right arm, Patient Position: Chair, Cuff Size: Adult Regular)   Pulse 86   Temp 97.7  F (36.5  C) (Tympanic)   Ht 1.778 m (5' 10\")   Wt 92.1 kg (203 lb)   SpO2 96%   BMI 29.13 kg/m   Estimated body mass index is 29.13 kg/m  as calculated from the following:    Height as of this encounter: 1.778 m (5' 10\").    Weight as of this encounter: 92.1 kg (203 lb).  Medication Reconciliation: complete  Beronica Thurman LPN    "

## 2021-10-20 ENCOUNTER — DOCUMENTATION ONLY (OUTPATIENT)
Dept: SLEEP MEDICINE | Facility: HOSPITAL | Age: 41
End: 2021-10-20

## 2021-10-20 ASSESSMENT — ANXIETY QUESTIONNAIRES: GAD7 TOTAL SCORE: 1

## 2021-10-20 ASSESSMENT — ASTHMA QUESTIONNAIRES: ACT_TOTALSCORE: 14

## 2021-10-20 NOTE — PROGRESS NOTES
"Chart review prior to sleep testing.    Patient Summary:  41 year old yo male who is referred for poor sleep quality.    Patient Active Problem List    Diagnosis Date Noted     Alcoholism (H) 07/08/2020     Priority: Medium     Hypertriglyceridemia 07/08/2020     Priority: Medium     Tobacco abuse 07/08/2020     Priority: Medium     Allergic rhinitis 11/23/2012     Priority: Medium     Asthma 11/23/2012     Priority: Medium     Impaired fasting glucose 11/23/2012     Priority: Medium     Insomnia 11/23/2012     Priority: Medium       Current Outpatient Medications   Medication     albuterol (PROAIR HFA/PROVENTIL HFA/VENTOLIN HFA) 108 (90 Base) MCG/ACT inhaler     amitriptyline (ELAVIL) 10 MG tablet     BREO ELLIPTA 100-25 MCG/INH inhaler     famotidine (PEPCID) 40 MG tablet     LANsoprazole (PREVACID) 30 MG DR capsule     montelukast (SINGULAIR) 10 MG tablet     triamcinolone (KENALOG) 0.1 % external cream     No current facility-administered medications for this visit.     Pertinent PMHx of chronic insomnia, alcoholism, HLD.    Current nocturnal medication includes amitriptyline 10-20mg PO at bedtime.    Found one visit with [facility] sleep clinic on 1/16/2013.  PSG with AHI 3, ramo SpO2 89%, alpha intrusion.  Follow-up visit on 6/26/2014 for chronic insomnia, managed with clonazepam 1mg and appearing stable.  I did not have more recent visits for review from another sleep clinic.    STOP-BANG score of 3, with unknown neck circumference.  Matthews score of 8.  BMI of Estimated body mass index is 29.13 kg/m  as calculated from the following:    Height as of 10/19/21: 1.778 m (5' 10\").    Weight as of 10/19/21: 92.1 kg (203 lb).     Per questionnaire: \"It was recommended by my sleep provider as I still don't sleep well.\"    Caffeine use:  Yes for 3+ per day.  No for within 6 hours of bed.    Tobacco use: Yes    Sleep pattern:  Workdays.  11:30pm - 6:30am, total sleep time 6-7 hours.  Weekends.  2am - 10am, " total sleep time 8 hours.  Time to fall asleep: ~30 minutes.  Awakenings: 2-3 times per night, 15 minutes to return to sleep.  Napping.  0 days per week, - hours per nap.    No for RLS screen.  No for sleep walking.  No for dream enactment behavior.  Yes for bruxism.    No for morning headaches.  Yes for snoring.  No for observed apnea.  No for FHx of EDWIGE.    SHx:  , lives with wife and 2 daughters.  Works in [employer].    A/P:  1.)  Borderline increased likelihood of EDWIGE with STOP-BANG score of 3.  2.)  Chronic insomnia  3.)  Suspected delayed sleep phase   - Unclear if the concern is for requesting sleep testing or clinic consult.   - If requesting sleep testing, then would have preference for in-lab PSG given borderline stop-bang, but otherwise would appear to be candidate for either home sleep testing or in-lab PSG.    ---  This note was written with the assistance of the Dragon voice-dictation technology software. The final document, although reviewed, may contain errors. For corrections, please contact the office.    Kofi Richey MD    Sleep Medicine  [facility]  ([phone])  [facility]  ([phone])

## 2021-10-20 NOTE — PROGRESS NOTES
STOP BANG       Name: Solis Prescott MRN# [id number]   Age: 41 year old YOB: 1980     Stop Bang questionnaire completed with a score of >3 to allow for HST     Have you been told you snore loudly (louder than talking or loud enough to be heard through doors)? YES    Do you often feel tired, fatigued, or sleepy during the daytime? YES    Has anyone observed you stop breathing during your sleep? NO    Do you have or are you being treated for high blood pressure? NO    Is your BMI greater than 35? NO 28.7    Is your neck size circumference 16 inches or greater? Unknown/ unanswered    Are you over 50 years old? NO    Stop Bang Score (# of yes): 3
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
## 2021-10-22 ENCOUNTER — TELEPHONE (OUTPATIENT)
Dept: SLEEP MEDICINE | Facility: HOSPITAL | Age: 41
End: 2021-10-22

## 2021-10-25 DIAGNOSIS — G47.33 OSA (OBSTRUCTIVE SLEEP APNEA): ICD-10-CM

## 2021-10-25 DIAGNOSIS — G47.00 INSOMNIA: Primary | ICD-10-CM

## 2021-11-01 ENCOUNTER — OFFICE VISIT (OUTPATIENT)
Dept: SLEEP MEDICINE | Facility: HOSPITAL | Age: 41
End: 2021-11-01
Attending: FAMILY MEDICINE
Payer: COMMERCIAL

## 2021-11-01 DIAGNOSIS — G47.33 OSA (OBSTRUCTIVE SLEEP APNEA): Primary | ICD-10-CM

## 2021-11-01 PROCEDURE — G0399 HOME SLEEP TEST/TYPE 3 PORTA: HCPCS | Mod: 26 | Performed by: FAMILY MEDICINE

## 2021-11-02 ENCOUNTER — DOCUMENTATION ONLY (OUTPATIENT)
Dept: SLEEP MEDICINE | Facility: HOSPITAL | Age: 41
End: 2021-11-02
Attending: FAMILY MEDICINE
Payer: COMMERCIAL

## 2021-11-02 DIAGNOSIS — G47.33 OSA (OBSTRUCTIVE SLEEP APNEA): ICD-10-CM

## 2021-11-02 DIAGNOSIS — G47.00 INSOMNIA: ICD-10-CM

## 2021-11-02 PROCEDURE — G0399 HOME SLEEP TEST/TYPE 3 PORTA: HCPCS

## 2021-11-02 NOTE — PROGRESS NOTES
This HSAT was performed using a Noxturnal T3 device which recorded snore, sound, movement activity, body position, nasal pressure, oronasal thermal airflow, pulse, oximetry and both chest and abdominal respiratory effort. HSAT data was restricted to the time patient states they were in bed.     HSAT was scored using 1B 4% hypopnea rule.     HST AHI (Non-PAT): 6     Snoring was reported as mild.  Time with SpO2 below 89% was 17.9 minutes.   Overall signal quality was good     Pt will follow up with sleep provider to determine appropriate therapy.

## 2021-11-05 NOTE — PROCEDURES
"HOME SLEEP STUDY INTERPRETATION    Patient: Solis Prescott  MRN: [id number]  YOB: 1980  Study Date: 11/1/2021  PCP/Referring Provider: Estelle Reyna  Ordering Provider: Kofi Richey MD, MD     Indications for Home Study: Solis Prescott is a 41 year old male with a history of chronic insomnia, alcoholism, HLD who presents with symptoms suggestive of obstructive sleep apnea.    Estimated body mass index is 29.13 kg/m  as calculated from the following:    Height as of 10/19/21: 1.778 m (5' 10\").    Weight as of 10/19/21: 92.1 kg (203 lb).  [place] Sleepiness Scale: 8/24  STOP-BANG: 3/8    Data: A full night home sleep study was performed recording the standard physiologic parameters including body position, movement, sound, nasal pressure, thermal oral airflow, chest and abdominal movements with respiratory inductance plethysmography, and oxygen saturation by pulse oximetry. Pulse rate was estimated by oximetry recording. This study was considered adequate based on > 4 hours of quality oximetry and respiratory recording. As specified by the AASM Manual for the Scoring of Sleep and Associated events, version 2.3, Rule VIII.D 1B, 4% oxygen desaturation scoring for hypopneas is used as a standard of care on all home sleep apnea testing.    Analysis Time:  431.4 minutes    Respiration:   Sleep Associated Hypoxemia: sustained hypoxemia was not present. Average oxygen saturation was 92.5%.  Time with saturation less than or equal to 88% was 15.3 minutes, though was present in a singular period of unexplained sustained hypoxemia that I suspect represents artifact. The lowest oxygen saturation was 83%.   Snoring: Snoring was present.  Respiratory events: The home study revealed a presence of 3 obstructive apneas and 0 mixed and central apneas. There were 40 hypopneas resulting in a combined apnea/hypopnea index [AHI] of 6 events per hour.  AHI was 11.9 per hour supine, - per hour prone, 0.9 per " hour on left side, and 0.6 per hour on right side.   Pattern: Excluding events noted above, respiratory rate and pattern was Normal.    Position: Percent of time spent: supine - 46.7%, prone - 0%, on left - 29.5%, on right - 23.3%.    Heart Rate: By pulse oximetry normal rate was noted.     Assessment:   Mild obstructive sleep apnea.  Strong positional component (supine AHI 11.9, lateral AHI < 1).  Sleep associated hypoxemia was present, though I suspect this represents artifact.    Recommendations:  Consider auto-CPAP at 5-15 cmH2O, oral appliance therapy, positional therapy or polysomnography with full night PAP titration.  Suggest optimizing sleep hygiene and avoiding sleep deprivation.  Weight management.    Diagnosis Code(s): Obstructive Sleep Apnea G47.33, Hypoxemia G47.36    Kofi Richey MD, MD, November 5, 2021   Diplomate, American Board of Family Medicine, Sleep Medicine

## 2021-11-09 ENCOUNTER — VIRTUAL VISIT (OUTPATIENT)
Dept: SLEEP MEDICINE | Facility: HOSPITAL | Age: 41
End: 2021-11-09
Attending: FAMILY MEDICINE
Payer: COMMERCIAL

## 2021-11-09 DIAGNOSIS — G47.00 INSOMNIA, UNSPECIFIED TYPE: Primary | ICD-10-CM

## 2021-11-09 PROCEDURE — 99204 OFFICE O/P NEW MOD 45 MIN: CPT | Mod: 95 | Performed by: FAMILY MEDICINE

## 2021-11-09 NOTE — PROGRESS NOTES
"Solis Prescott is a 41 year old male who is being evaluated via a billable video visit.       The patient has been notified of following:      \"This video visit will be conducted via a call between you and your physician/provider. We have found that certain health care needs can be provided without the need for an in-person physical exam.  This service lets us provide the care you need with a video conversation.  If a prescription is necessary we can send it directly to your pharmacy.  If lab work is needed we can place an order for that and you can then stop by our lab to have the test done at a later time.     Video visits are billed at different rates depending on your insurance coverage.  Please reach out to your insurance provider with any questions.     If during the course of the call the physician/provider feels a video visit is not appropriate, you will not be charged for this service.\"     Patient has given verbal consent for Video visit? Yes  How would you like to obtain your AVS? Mail a copy  If you are dropped from the video visit, the video invite should be resent to: Text to cell phone: [phone]  Will anyone else be joining your video visit? No  If patient encounters technical issues they should call [phone]      Video-Visit Details     Type of service:  Video Visit     Video Start Time: 3:30pm  Video End Time: 4:05pm    Originating Location (pt. Location): Home     Distant Location (provider location):  [facility]      Platform used for Video Visit: Doximity    Virtual visit for review of HST results and transfer of care for chronic insomnia.     Assessment:  - Mild obstructive sleep apnea.  - Strong positional component (supine AHI 11.9, lateral AHI < 1).  - Sleep associated hypoxemia was present, though I suspect this represents artifact.  -Chronic sleep onset and maintenance insomnia    Plan:  -We discussed long-term management for chronic insomnia may " include medication, but also the important role of behavioral modification in the form of cognitive behavioral therapy for insomnia.  -We reviewed cognitive behavioral therapy for insomnia today, including sleep hygiene, stimulus control, sleep restriction.  As we start these changes, I did not see a specific concern with returning to clonazepam 0.5-1 mg at bedtime, with goal to discontinue this in the next 3-6 months.  -Given the very mild nature of his obstructive sleep apnea, we elected for no specific treatment at this time, though we may reconsider if we have ongoing difficulties with sleep maintenance insomnia.  -Plan for follow-up in 1 month.    SUBJECTIVE:  Solis Prescott is a 41 year old year old male.    41 year old yo male who is referred for poor sleep quality.    Pertinent PMHx of chronic insomnia, alcoholism, HLD.     Current nocturnal medication includes amitriptyline 10-20mg PO at bedtime.     Found one visit with [facility] sleep clinic on 1/16/2013.  PSG with AHI 3, ramo SpO2 89%, alpha intrusion.  Follow-up visit on 6/26/2014 for chronic insomnia, managed with clonazepam 1mg and appearing stable.  I did not have more recent visits for review from another sleep clinic.     Today -we reviewed his home sleep test study in detail, but is also considering transferring care for his chronic insomnia.  He reports a history of insomnia for 10+ years has included difficulty falling asleep and staying asleep.  He previously followed with the [facility] sleep clinic and [facility].  Historically, he has been tried on a few different medications including:  Trazodone  Clonazepam-General well-tolerated reportedly taken for approximately 8 years, discontinued for unclear reasons when returning to [place] in May.  Zolpidem-mild benefit, significant morning grogginess, concern given multiple associates with abnormal nocturnal behaviors  Amitriptyline-mild benefit    STOP-BANG score of 3, with unknown neck  "circumference.  [place] score of 8.  BMI of Estimated body mass index is 29.13 kg/m  as calculated from the following:    Height as of 10/19/21: 1.778 m (5' 10\").    Weight as of 10/19/21: 92.1 kg (203 lb).      Per questionnaire: \"It was recommended by my sleep provider as I still don't sleep well.\"     Caffeine use:  Yes for 3+ per day.  No for within 6 hours of bed.     Tobacco use: Yes     Sleep pattern:  Workdays.  11:30pm - 6:30am, total sleep time 6-7 hours.  Weekends.  2am - 10am, total sleep time 8 hours.  Time to fall asleep: ~30 minutes.  Awakenings: 2-3 times per night, 15 minutes to return to sleep.  Napping.  0 days per week, - hours per nap.     No for RLS screen.  No for sleep walking.  No for dream enactment behavior.  Yes for bruxism.     No for morning headaches.  Yes for snoring.  No for observed apnea.  No for FHx of EDWIGE.     SHx:  , lives with wife and 2 daughters.  Works in IT support.    HOME SLEEP STUDY INTERPRETATION     Patient: Solis Prescott  MRN: [id number]  YOB: 1980  Study Date: 11/1/2021  PCP/Referring Provider: Estelle Reyna  Ordering Provider: Kofi Richey MD, MD     Indications for Home Study: Solis Prescott is a 41 year old male with a history of chronic insomnia, alcoholism, HLD who presents with symptoms suggestive of obstructive sleep apnea.     Estimated body mass index is 29.13 kg/m  as calculated from the following:    Height as of 10/19/21: 1.778 m (5' 10\").    Weight as of 10/19/21: 92.1 kg (203 lb).  [place] Sleepiness Scale: 8/24  STOP-BANG: 3/8     Data: A full night home sleep study was performed recording the standard physiologic parameters including body position, movement, sound, nasal pressure, thermal oral airflow, chest and abdominal movements with respiratory inductance plethysmography, and oxygen saturation by pulse oximetry. Pulse rate was estimated by oximetry recording. This study was considered adequate based on > 4 " hours of quality oximetry and respiratory recording. As specified by the AASM Manual for the Scoring of Sleep and Associated events, version 2.3, Rule VIII.D 1B, 4% oxygen desaturation scoring for hypopneas is used as a standard of care on all home sleep apnea testing.     Analysis Time:  431.4 minutes     Respiration:   Sleep Associated Hypoxemia: sustained hypoxemia was not present. Average oxygen saturation was 92.5%.  Time with saturation less than or equal to 88% was 15.3 minutes, though was present in a singular period of unexplained sustained hypoxemia that I suspect represents artifact. The lowest oxygen saturation was 83%.   Snoring: Snoring was present.  Respiratory events: The home study revealed a presence of 3 obstructive apneas and 0 mixed and central apneas. There were 40 hypopneas resulting in a combined apnea/hypopnea index [AHI] of 6 events per hour.  AHI was 11.9 per hour supine, - per hour prone, 0.9 per hour on left side, and 0.6 per hour on right side.   Pattern: Excluding events noted above, respiratory rate and pattern was Normal.     Position: Percent of time spent: supine - 46.7%, prone - 0%, on left - 29.5%, on right - 23.3%.     Heart Rate: By pulse oximetry normal rate was noted.      Assessment:   Mild obstructive sleep apnea.  Strong positional component (supine AHI 11.9, lateral AHI < 1).  Sleep associated hypoxemia was present, though I suspect this represents artifact.     Recommendations:  Consider auto-CPAP at 5-15 cmH2O, oral appliance therapy, positional therapy or polysomnography with full night PAP titration.  Suggest optimizing sleep hygiene and avoiding sleep deprivation.  Weight management.     Diagnosis Code(s): Obstructive Sleep Apnea G47.33, Hypoxemia G47.36     Kofi Richey MD, MD, November 5, 2021   Diplomate, American Board of Family Medicine, Sleep Medicine      Past medical history:    Patient Active Problem List    Diagnosis Date Noted     Alcoholism (H)  07/08/2020     Priority: Medium     Hypertriglyceridemia 07/08/2020     Priority: Medium     Tobacco abuse 07/08/2020     Priority: Medium     Allergic rhinitis 11/23/2012     Priority: Medium     Asthma 11/23/2012     Priority: Medium     Impaired fasting glucose 11/23/2012     Priority: Medium     Insomnia 11/23/2012     Priority: Medium       10 point ROS of systems including Constitutional, Eyes, Respiratory, Cardiovascular, Gastroenterology, Genitourinary, Integumentary, Muscularskeletal, Psychiatric were all negative except for pertinent positives noted in my HPI.    Current Outpatient Medications   Medication Sig Dispense Refill     albuterol (PROAIR HFA/PROVENTIL HFA/VENTOLIN HFA) 108 (90 Base) MCG/ACT inhaler Inhale 2 puffs into the lungs       amitriptyline (ELAVIL) 10 MG tablet Take 1-2 tablets (10-20 mg) by mouth nightly as needed for sleep 30 tablet 0     BREO ELLIPTA 100-25 MCG/INH inhaler INHALE 1 PUFF INTO THE LUNGS EVERY DAY. RINSE MOUTH AFTER USE 1 each 2     famotidine (PEPCID) 40 MG tablet Take 40 mg by mouth       LANsoprazole (PREVACID) 30 MG DR capsule Take 1 capsule (30 mg) by mouth every morning (before breakfast) 30 capsule 0     montelukast (SINGULAIR) 10 MG tablet Take 1 tablet (10 mg) by mouth At Bedtime 30 tablet 0     triamcinolone (KENALOG) 0.1 % external cream          OBJECTIVE:  There were no vitals taken for this visit.    Physical Exam     ---  This note was written with the assistance of the Dragon voice-dictation technology software. The final document, although reviewed, may contain errors. For corrections, please contact the office.    Kofi Richey MD    Sleep Medicine  [facility]  ([phone])  [facility]  ([phone])

## 2021-11-09 NOTE — PROGRESS NOTES
"Solis is a 41 year old who is being evaluated via a billable video visit.      How would you like to obtain your AVS? MyChart  If the video visit is dropped, the invitation should be resent by: Text to cell phone: [phone]  Will anyone else be joining your video visit? No  {If patient encounters technical issues they should call [phone] :[id number]}    Video Start Time: {video visit start/end time for provider to select:152948}    {PROVIDER CHARTING PREFERENCE:829409}    Subjective   Solis is a 41 year old who presents for the following health issues {ACCOMPANIED BY STATEMENT (Optional):205370}    HPI     ***    Review of Systems   {ROS COMP (Optional):530885}      Objective           Vitals:  No vitals were obtained today due to virtual visit.    Physical Exam   {video visit exam brief selected:120014::\"GENERAL: Healthy, alert and no distress\",\"EYES: Eyes grossly normal to inspection.  No discharge or erythema, or obvious scleral/conjunctival abnormalities.\",\"RESP: No audible wheeze, cough, or visible cyanosis.  No visible retractions or increased work of breathing.  \",\"SKIN: Visible skin clear. No significant rash, abnormal pigmentation or lesions.\",\"NEURO: Cranial nerves grossly intact.  Mentation and speech appropriate for age.\",\"PSYCH: Mentation appears normal, affect normal/bright, judgement and insight intact, normal speech and appearance well-groomed.\"}    {Diagnostic Test Results (Optional):576189}    {AMBULATORY ATTESTATION (Optional):244925}        Video-Visit Details    Type of service:  Video Visit    Video End Time:{video visit start/end time for provider to select:152948}    Originating Location (pt. Location): {video visit patient location:942239::\"Home\"}    Distant Location (provider location):  [facility]     Platform used for Video Visit: {Virtual Visit Platforms:514633::\"AmWell\"}  "

## 2021-11-10 ENCOUNTER — MYC MEDICAL ADVICE (OUTPATIENT)
Dept: SLEEP MEDICINE | Facility: HOSPITAL | Age: 41
End: 2021-11-10
Payer: COMMERCIAL

## 2021-11-10 DIAGNOSIS — F51.04 PSYCHOPHYSIOLOGICAL INSOMNIA: Primary | ICD-10-CM

## 2021-11-10 RX ORDER — CLONAZEPAM 0.5 MG/1
TABLET ORAL
Qty: 60 TABLET | Refills: 5 | Status: SHIPPED | OUTPATIENT
Start: 2021-11-10 | End: 2022-05-26

## 2021-11-11 ENCOUNTER — TELEPHONE (OUTPATIENT)
Dept: FAMILY MEDICINE | Facility: OTHER | Age: 41
End: 2021-11-11
Payer: COMMERCIAL

## 2021-11-11 DIAGNOSIS — J45.40 MODERATE PERSISTENT ASTHMA WITHOUT COMPLICATION: Primary | ICD-10-CM

## 2021-11-11 DIAGNOSIS — K21.9 GASTROESOPHAGEAL REFLUX DISEASE, UNSPECIFIED WHETHER ESOPHAGITIS PRESENT: ICD-10-CM

## 2021-11-11 NOTE — TELEPHONE ENCOUNTER
Patient would like Axel or her nurse to call him regarding some medication that his insurance wont pay for .  He would like alternatives but doesn't know if [facility]el would approve them.    Breo vs. del era and Symbicort are covered.    Prevacid  Vs. pantoprazole is covered

## 2021-11-11 NOTE — TELEPHONE ENCOUNTER
Please replace Breo with Symbacort or Dulera. Please replace the prevacid with omeperzole. These should be sent to [facility]. I will notify patient when this is completed. He would like it done today.

## 2021-11-12 RX ORDER — OMEPRAZOLE 40 MG/1
40 CAPSULE, DELAYED RELEASE ORAL DAILY
Qty: 90 CAPSULE | Refills: 0 | Status: SHIPPED | OUTPATIENT
Start: 2021-11-12 | End: 2021-12-20

## 2021-11-12 NOTE — TELEPHONE ENCOUNTER
Spoke with patient. He will try these and report back in 2-4 weeks on how they are working for him.

## 2021-11-15 DIAGNOSIS — J45.40 MODERATE PERSISTENT ASTHMA WITHOUT COMPLICATION: ICD-10-CM

## 2021-11-15 RX ORDER — MONTELUKAST SODIUM 10 MG/1
10 TABLET ORAL AT BEDTIME
Qty: 90 TABLET | Refills: 0 | Status: SHIPPED | OUTPATIENT
Start: 2021-11-15 | End: 2022-02-10

## 2021-11-20 ENCOUNTER — HEALTH MAINTENANCE LETTER (OUTPATIENT)
Age: 41
End: 2021-11-20

## 2021-12-03 ENCOUNTER — MYC MEDICAL ADVICE (OUTPATIENT)
Dept: FAMILY MEDICINE | Facility: OTHER | Age: 41
End: 2021-12-03
Payer: COMMERCIAL

## 2021-12-03 DIAGNOSIS — J45.40 MODERATE PERSISTENT ASTHMA WITHOUT COMPLICATION: Primary | ICD-10-CM

## 2021-12-09 ENCOUNTER — ANESTHESIA EVENT (OUTPATIENT)
Dept: SURGERY | Facility: HOSPITAL | Age: 41
End: 2021-12-09
Payer: COMMERCIAL

## 2021-12-09 ASSESSMENT — LIFESTYLE VARIABLES: TOBACCO_USE: 1

## 2021-12-09 NOTE — ANESTHESIA PREPROCEDURE EVALUATION
Anesthesia Pre-Procedure Evaluation    Patient: Solis Prescott   MRN: [id number] : 1980        Preoperative Diagnosis: Gastroesophageal reflux disease with esophagitis [K21.00]    Procedure : Procedure(s):  Upper endoscopy          No past medical history on file.   Past Surgical History:   Procedure Laterality Date     SINUS SURGERY       VASECTOMY        No Known Allergies   Social History     Tobacco Use     Smoking status: Current Every Day Smoker     Years: .00     Types: Cigarettes     Smokeless tobacco: Never Used   Substance Use Topics     Alcohol use: Yes     Alcohol/week: 6.0 standard drinks     Types: 6 Cans of beer per week     Comment: weekly      Wt Readings from Last 1 Encounters:   10/19/21 92.1 kg (203 lb)        Anesthesia Evaluation   Pt has had prior anesthetic. Type: General and MAC.    No history of anesthetic complications       ROS/MED HX  ENT/Pulmonary:     (+) tobacco use, Current use, 1 packs/day, Mild Persistent, asthma Treatment: Inhaler prn,      Neurologic:  - neg neurologic ROS     Cardiovascular:  - neg cardiovascular ROS   (+) Dyslipidemia -----    METS/Exercise Tolerance: >4 METS    Hematologic:  - neg hematologic  ROS     Musculoskeletal:  - neg musculoskeletal ROS     GI/Hepatic:     (+) GERD, Asymptomatic on medication,     Renal/Genitourinary:  - neg Renal ROS     Endo:     (+) type II DM, Not using insulin,     Psychiatric/Substance Use:     (+) alcohol abuse     Infectious Disease:  - neg infectious disease ROS     Malignancy:  - neg malignancy ROS     Other:  - neg other ROS          Physical Exam    Airway  airway exam normal      Mallampati: I   TM distance: > 3 FB   Neck ROM: full   Mouth opening: > 3 cm    Respiratory Devices and Support         Dental  no notable dental history         Cardiovascular   cardiovascular exam normal       Rhythm and rate: regular and normal     Pulmonary   pulmonary exam normal        breath sounds clear to auscultation            OUTSIDE LABS:  CBC: No results found for: WBC, HGB, HCT, PLT  BMP:   Lab Results   Component Value Date     09/30/2021    POTASSIUM 4.2 09/30/2021    CHLORIDE 106 09/30/2021    CO2 28 09/30/2021    BUN 15 09/30/2021    CR 1.25 09/30/2021     (H) 09/30/2021     COAGS: No results found for: PTT, INR, FIBR  POC: No results found for: BGM, HCG, HCGS  HEPATIC:   Lab Results   Component Value Date    ALBUMIN 4.1 09/30/2021    PROTTOTAL 7.5 09/30/2021    ALT 89 (H) 09/30/2021    AST 50 (H) 09/30/2021    ALKPHOS 68 09/30/2021    BILITOTAL 0.6 09/30/2021     OTHER:   Lab Results   Component Value Date    A1C 5.7 (H) 09/30/2021    CHRIS 9.5 09/30/2021       Anesthesia Plan    ASA Status:  2   NPO Status:  NPO Appropriate    Anesthesia Type: MAC.     - Reason for MAC: straight local not clinically adequate              Consents    Anesthesia Plan(s) and associated risks, benefits, and realistic alternatives discussed. Questions answered and patient/representative(s) expressed understanding.    - Discussed:     - Discussed with:  Patient      - Extended Intubation/Ventilatory Support Discussed: No.      - Patient is DNR/DNI Status: No    Use of blood products discussed: No .     Postoperative Care            Comments:                KRIS Cornelius CRNA

## 2021-12-12 ENCOUNTER — OFFICE VISIT (OUTPATIENT)
Dept: FAMILY MEDICINE | Facility: OTHER | Age: 41
End: 2021-12-12
Attending: SURGERY
Payer: COMMERCIAL

## 2021-12-12 DIAGNOSIS — Z01.818 PREOP TESTING: ICD-10-CM

## 2021-12-12 PROCEDURE — U0003 INFECTIOUS AGENT DETECTION BY NUCLEIC ACID (DNA OR RNA); SEVERE ACUTE RESPIRATORY SYNDROME CORONAVIRUS 2 (SARS-COV-2) (CORONAVIRUS DISEASE [COVID-19]), AMPLIFIED PROBE TECHNIQUE, MAKING USE OF HIGH THROUGHPUT TECHNOLOGIES AS DESCRIBED BY CMS-2020-01-R: HCPCS

## 2021-12-12 PROCEDURE — U0005 INFEC AGEN DETEC AMPLI PROBE: HCPCS

## 2021-12-13 LAB — SARS-COV-2 RNA RESP QL NAA+PROBE: NEGATIVE

## 2021-12-16 ENCOUNTER — HOSPITAL ENCOUNTER (OUTPATIENT)
Facility: HOSPITAL | Age: 41
Discharge: HOME | End: 2021-12-16
Attending: SURGERY | Admitting: SURGERY
Payer: COMMERCIAL

## 2021-12-16 ENCOUNTER — ANESTHESIA (OUTPATIENT)
Dept: SURGERY | Facility: HOSPITAL | Age: 41
End: 2021-12-16
Payer: COMMERCIAL

## 2021-12-16 VITALS
HEIGHT: 70 IN | DIASTOLIC BLOOD PRESSURE: 88 MMHG | OXYGEN SATURATION: 95 % | WEIGHT: 203 LBS | HEART RATE: 78 BPM | TEMPERATURE: 98.4 F | SYSTOLIC BLOOD PRESSURE: 135 MMHG | RESPIRATION RATE: 16 BRPM | BODY MASS INDEX: 29.06 KG/M2

## 2021-12-16 PROCEDURE — 43239 EGD BIOPSY SINGLE/MULTIPLE: CPT | Performed by: NURSE ANESTHETIST, CERTIFIED REGISTERED

## 2021-12-16 PROCEDURE — 258N000003 HC RX IP 258 OP 636: Performed by: NURSE ANESTHETIST, CERTIFIED REGISTERED

## 2021-12-16 PROCEDURE — 88305 TISSUE EXAM BY PATHOLOGIST: CPT | Mod: TC | Performed by: SURGERY

## 2021-12-16 PROCEDURE — 272N000001 HC OR GENERAL SUPPLY STERILE: Performed by: SURGERY

## 2021-12-16 PROCEDURE — 250N000011 HC RX IP 250 OP 636: Performed by: NURSE ANESTHETIST, CERTIFIED REGISTERED

## 2021-12-16 PROCEDURE — 360N000075 HC SURGERY LEVEL 2, PER MIN: Performed by: SURGERY

## 2021-12-16 PROCEDURE — 710N000012 HC RECOVERY PHASE 2, PER MINUTE: Performed by: SURGERY

## 2021-12-16 PROCEDURE — 43239 EGD BIOPSY SINGLE/MULTIPLE: CPT | Performed by: SURGERY

## 2021-12-16 PROCEDURE — 370N000017 HC ANESTHESIA TECHNICAL FEE, PER MIN: Performed by: SURGERY

## 2021-12-16 PROCEDURE — 250N000009 HC RX 250: Performed by: NURSE ANESTHETIST, CERTIFIED REGISTERED

## 2021-12-16 PROCEDURE — 88305 TISSUE EXAM BY PATHOLOGIST: CPT | Mod: 26 | Performed by: PATHOLOGY

## 2021-12-16 PROCEDURE — 999N000141 HC STATISTIC PRE-PROCEDURE NURSING ASSESSMENT: Performed by: SURGERY

## 2021-12-16 RX ORDER — SODIUM CHLORIDE, SODIUM LACTATE, POTASSIUM CHLORIDE, CALCIUM CHLORIDE 600; 310; 30; 20 MG/100ML; MG/100ML; MG/100ML; MG/100ML
INJECTION, SOLUTION INTRAVENOUS CONTINUOUS
Status: DISCONTINUED | OUTPATIENT
Start: 2021-12-16 | End: 2021-12-16 | Stop reason: HOSPADM

## 2021-12-16 RX ORDER — HYDRALAZINE HYDROCHLORIDE 20 MG/ML
5-10 INJECTION INTRAMUSCULAR; INTRAVENOUS EVERY 10 MIN PRN
Status: DISCONTINUED | OUTPATIENT
Start: 2021-12-16 | End: 2021-12-16 | Stop reason: HOSPADM

## 2021-12-16 RX ORDER — NALOXONE HYDROCHLORIDE 0.4 MG/ML
0.2 INJECTION, SOLUTION INTRAMUSCULAR; INTRAVENOUS; SUBCUTANEOUS
Status: DISCONTINUED | OUTPATIENT
Start: 2021-12-16 | End: 2021-12-16 | Stop reason: HOSPADM

## 2021-12-16 RX ORDER — LIDOCAINE 40 MG/G
CREAM TOPICAL
Status: DISCONTINUED | OUTPATIENT
Start: 2021-12-16 | End: 2021-12-16 | Stop reason: HOSPADM

## 2021-12-16 RX ORDER — OXYCODONE HYDROCHLORIDE 5 MG/1
5 TABLET ORAL EVERY 4 HOURS PRN
Status: DISCONTINUED | OUTPATIENT
Start: 2021-12-16 | End: 2021-12-16 | Stop reason: HOSPADM

## 2021-12-16 RX ORDER — HYDROMORPHONE HYDROCHLORIDE 1 MG/ML
0.2 INJECTION, SOLUTION INTRAMUSCULAR; INTRAVENOUS; SUBCUTANEOUS EVERY 5 MIN PRN
Status: DISCONTINUED | OUTPATIENT
Start: 2021-12-16 | End: 2021-12-16 | Stop reason: HOSPADM

## 2021-12-16 RX ORDER — PROPOFOL 10 MG/ML
INJECTION, EMULSION INTRAVENOUS PRN
Status: DISCONTINUED | OUTPATIENT
Start: 2021-12-16 | End: 2021-12-16

## 2021-12-16 RX ORDER — FENTANYL CITRATE 50 UG/ML
25 INJECTION, SOLUTION INTRAMUSCULAR; INTRAVENOUS
Status: DISCONTINUED | OUTPATIENT
Start: 2021-12-16 | End: 2021-12-16 | Stop reason: HOSPADM

## 2021-12-16 RX ORDER — MEPERIDINE HYDROCHLORIDE 25 MG/ML
12.5 INJECTION INTRAMUSCULAR; INTRAVENOUS; SUBCUTANEOUS
Status: DISCONTINUED | OUTPATIENT
Start: 2021-12-16 | End: 2021-12-16 | Stop reason: HOSPADM

## 2021-12-16 RX ORDER — ONDANSETRON 2 MG/ML
4 INJECTION INTRAMUSCULAR; INTRAVENOUS EVERY 30 MIN PRN
Status: DISCONTINUED | OUTPATIENT
Start: 2021-12-16 | End: 2021-12-16 | Stop reason: HOSPADM

## 2021-12-16 RX ORDER — ONDANSETRON 4 MG/1
4 TABLET, ORALLY DISINTEGRATING ORAL EVERY 30 MIN PRN
Status: DISCONTINUED | OUTPATIENT
Start: 2021-12-16 | End: 2021-12-16 | Stop reason: HOSPADM

## 2021-12-16 RX ORDER — HALOPERIDOL 5 MG/ML
0.5 INJECTION INTRAMUSCULAR
Status: DISCONTINUED | OUTPATIENT
Start: 2021-12-16 | End: 2021-12-16 | Stop reason: HOSPADM

## 2021-12-16 RX ORDER — NALOXONE HYDROCHLORIDE 0.4 MG/ML
0.4 INJECTION, SOLUTION INTRAMUSCULAR; INTRAVENOUS; SUBCUTANEOUS
Status: DISCONTINUED | OUTPATIENT
Start: 2021-12-16 | End: 2021-12-16 | Stop reason: HOSPADM

## 2021-12-16 RX ORDER — FENTANYL CITRATE 50 UG/ML
25 INJECTION, SOLUTION INTRAMUSCULAR; INTRAVENOUS EVERY 5 MIN PRN
Status: DISCONTINUED | OUTPATIENT
Start: 2021-12-16 | End: 2021-12-16 | Stop reason: HOSPADM

## 2021-12-16 RX ADMIN — SODIUM CHLORIDE, POTASSIUM CHLORIDE, SODIUM LACTATE AND CALCIUM CHLORIDE: 600; 310; 30; 20 INJECTION, SOLUTION INTRAVENOUS at 13:01

## 2021-12-16 RX ADMIN — LIDOCAINE HYDROCHLORIDE 50 MG: 10 INJECTION, SOLUTION EPIDURAL; INFILTRATION; INTRACAUDAL; PERINEURAL at 13:38

## 2021-12-16 RX ADMIN — PROPOFOL 100 MG: 10 INJECTION, EMULSION INTRAVENOUS at 13:38

## 2021-12-16 ASSESSMENT — MIFFLIN-ST. JEOR: SCORE: 1827.05

## 2021-12-16 NOTE — ANESTHESIA POSTPROCEDURE EVALUATION
Patient: Solis Prescott    Procedure: Procedure(s):  Upper endoscopy with biopsies       Diagnosis:Gastroesophageal reflux disease with esophagitis [K21.00]  Diagnosis Additional Information: No value filed.    Anesthesia Type:  MAC    Note:  Disposition: Outpatient   Postop Pain Control: Uneventful            Sign Out: Well controlled pain   PONV: No   Neuro/Psych: Uneventful            Sign Out: Acceptable/Baseline neuro status   Airway/Respiratory: Uneventful            Sign Out: Acceptable/Baseline resp. status   CV/Hemodynamics: Uneventful            Sign Out: Acceptable CV status; No obvious hypovolemia; No obvious fluid overload   Other NRE: NONE   DID A NON-ROUTINE EVENT OCCUR? No           Last vitals:  Vitals Value Taken Time   /84 12/16/21 1355   Temp 98.4  F (36.9  C) 12/16/21 1345   Pulse 78 12/16/21 1355   Resp 16 12/16/21 1350   SpO2 95 % 12/16/21 1355   Vitals shown include unvalidated device data.    Electronically Signed By: KRIS Lara CRNA  December 16, 2021  1:57 PM

## 2021-12-16 NOTE — OR NURSING
Patient states ready to leave.  Spoke with Dr. Lynn and all discharge instructions given.  Discharged to home ambulatory accompanied by wife.

## 2021-12-16 NOTE — H&P
HISTORY AND PHYSICAL - ENDOSCOPY   12/16/2021    Patient : Solis Prescott    Planned Procedures : Esophagogastroduodenoscopy    This is a 41 year old male with a need for an Esophagogastroduodenoscopy for Gastroesohpageal Reflux.    Last EGD : never  History of GERD : YES  History of PUD : NO  History of Manuel's : NO  On PPI's : YES  History of dysphagia : NO   Dysphagia to solids greater than liquids : Not Applicable  Hematemesis : NO  Melena : NO    Past Medical History:  History reviewed. No pertinent past medical history.    Past Surgical History:  Past Surgical History:   Procedure Laterality Date     SINUS SURGERY       VASECTOMY         Family History History:  Family History   Problem Relation Age of Onset     Alcoholism Mother      Depression Mother      Mental Illness Mother      Hearing Loss Father      Depression Sister        History of Tobacco Use:  History   Smoking Status     Current Every Day Smoker     Years: 26.00     Types: Cigarettes   Smokeless Tobacco     Never Used       Current Medications:  No current outpatient medications on file.       Allergies:  No Known Allergies    ROS:  Constitutional: negative  Eyes: negative  Ears, nose, mouth, throat, and face: negative  Respiratory: positive for asthma  Cardiovascular: negative  Gastrointestinal: positive for GERD  Genitourinary:negative  Integument/breast: negative  Hematologic/lymphatic: negative  Musculoskeletal: negative  Neurological: negative  Behvioral/Psych: positive for alcohol and tobacco use  Endocrine: negative  Allergic/Immunologic: negative    PHYSICAL EXAM:     Vital signs: /86   Pulse 86   Temp 98.6  F (37  C) (Tympanic)   Resp 16   SpO2 96%    BMI: There is no height or weight on file to calculate BMI.   General: Normal, healthy, cooperative, in no acute distress, alert   Skin: no rashes   Lungs: clear to auscultation   CV: Regular rate and rhythm   Abdominal: non-distended, soft, non-tender to  palpation   Extremities: No cyanosis, clubbing or edema noted bilaterally in Upper and Lower Extremities   Neurological: without deficit    Assessment:   41 year old male with need of an Esophagogastroduodenoscopy for Gastroesohpageal Reflux.    Plan:   Will plan on doing an Esophagogastroduodenoscopy    The risks, benefits, and alternatives to the planned procedure were fully discussed with the patient and/or the patient's representative(s). The risks of bleeding, infection, death, missing pathology, the need for additional procedures intra-operatively, the possible need for intra-operative consults, the possible need for transfusion therapy, cardiopulmonary compromise, the possible need for additional surgery for a complication were discussed with the patient and/or the patient's representative(s). The patient's and/or patient's representative(s) questions were addressed and answered. Informed consent was obtained from the patient and/or the patient's representative(s). The patient and/or the patient's representative(s) consent to proceed.    Specific risks:  Risks include but are not limited to bleeding, perforation, missing lesions, need for additional procedures, reaction to anesthesia.  All the patients questions were answered.  The patient consents to proceed.  The procedures will be scheduled.

## 2021-12-16 NOTE — ANESTHESIA CARE TRANSFER NOTE
Patient: Solis Prescott    Procedure: Procedure(s):  Upper endoscopy with biopsies       Diagnosis: Gastroesophageal reflux disease with esophagitis [K21.00]  Diagnosis Additional Information: No value filed.    Anesthesia Type:   MAC     Note:        Oxygen Supplementation: nasal cannula    Independent Airway: airway patency satisfactory and stable  Dentition: dentition unchanged  Vital Signs Stable: post-procedure vital signs reviewed and stable  Report to RN Given: handoff report given  Patient transferred to: Phase II    Handoff Report: Identifed the Patient, Identified the Reponsible Provider, Reviewed the pertinent medical history, Discussed the surgical course, Reviewed Intra-OP anesthesia mangement and issues during anesthesia, Set expectations for post-procedure period and Allowed opportunity for questions and acknowledgement of understanding      Vitals:  Vitals Value Taken Time   /60 12/16/21 1346   Temp     Pulse 81 12/16/21 1346   Resp     SpO2 95 % 12/16/21 1348   Vitals shown include unvalidated device data.    Electronically Signed By: KRIS Glover CRNA  December 16, 2021  1:48 PM

## 2021-12-16 NOTE — OP NOTE
REPORT OF OPERATION  DATE OF PROCEDURE: 12/16/2021    PATIENT: Solis Prescott    SURGERY PERFORMED: Esophagogastroduodenoscopy with biopsies     PREOPERATIVE DIAGNOSIS: Gastroesohpageal Reflux    POSTOPERATIVE DIAGNOSIS:    Normal Esophagogastroduodenoscopy   Squamous columnar junction at 40    SURGEON: Maximilian Lynn MD    ASSISTANTS: None    ANESTHESIA: Monitored Anesthesia Care    COMPLICATIONS: None apparent    TRANSFUSIONS: None    TISSUE TO PATHOLOGY: Duodenal, Antral and Distal Esophageal    FINDINGS: Normal Esophagogastroduodenoscopy    INDICATIONS: This is a 41 year old male in need of an Esophagogastroduodenoscopy for Gastroesohpageal Reflux.  The patient will be taken to the endoscopy suite for that procedure.    DESCRIPTIONS OF PROCEDURE IN DETAIL: After consent was obtained the patient was taken to the operative suite and augustin in the left lateral decubitus position.  The patient was identified and the correct patient was confirmed. Monitored Anesthesia Care was given by anesthesia. A time out was performed verifying the correct patient and the correct procedure.  The entire operative team was in agreement.  All necessary equipment and supplies were in the room.    The endoscope was inserted into the mouth and passed without difficulty to the third portion of the duodenum.  Duodenal biopsies were taken.  The endoscope was then withdrawn through the duodenum, the duodenal bulb and pyloric channel and no abnormalities were noted.  The endoscope was brought back into the stomach and antral biopsies were obtained.  The endoscope was then retroflexed and no lesions of the fundus body or antrum were seen.  The endoscope was straightened back out and brought into the distal esophagus and a well-defined squamocolumnar junction was identified at 40 cm. Biopsies of the distal esophagus were taken.  The endoscope was slowly withdrawn through the remaining esophagus no other abnormalities are seen,  The  endoscope was withdrawn from the patient the patient was then taken to the recovery room in stable condition tolerated the procedure well.

## 2021-12-16 NOTE — DISCHARGE INSTRUCTIONS
Upper GI Endoscopy  An upper GI endoscopy lets your healthcare provider look right into the beginning of your gastrointestinal (GI) tract. The esophagus, stomach, and the first part of the small intestine (the duodenum) make up the upper GI tract.       During endoscopy, a long, flexible tube is used to view the inside of your upper GI tract.    Before the test  Follow these and any other instructions you are given before your endoscopy. If you don t follow the healthcare provider s instructions carefully, the test may need to be canceled or done over:     Follow any directions you are given for not eating or drinking before your test. In some cases, you may be able to take medicines with sips of water until 2 hours before the test. Talk with your provider about this.     Bring your X-rays and any other test results you have.    Because you will be sedated, arrange for an adult to drive you home after the exam.    Tell your provider before the exam if you are taking any medicines. This includes any over-the-counter and prescription medicines, vitamins, herbs, and supplements. Some medicines may be adjusted or stopped before the test. Don't stop any medicine unless directed by your provider.    Tell your provider if you have any health problems.  The procedure  Here is what to expect:    You will lie on the endoscopy table. People often lie on their left side.    You will be placed on a heart monitor and given oxygen.    Your throat may be numbed with a spray or gargle. You are given medicine through an IV (intravenous) line that will help you relax and stay comfortable. You may be awake or asleep during the test.    The healthcare provider will put the endoscope in your mouth and down your esophagus. It is thinner than most pieces of food that you swallow. It won't affect your breathing. The medicine helps keep you from gagging.    Air is put into your GI tract to expand it. It can make you burp.    During the  procedure, the healthcare provider can take tissue samples (biopsies) and remove abnormalities such as polyps. The provider can also treat abnormalities using tools placed through the endoscope. You will not feel this.     The endoscope carries images of your upper GI tract to a video screen. If you are awake, you may be able to look at the images.    After the procedure is done, you will rest for a time. An adult must drive you home.    After the procedure, you may feel gassy for a few hours. You may have a sore throat which should improve within a day or 2.  When to call your healthcare provider  Call your healthcare provider if you have:     Black or tarry stools, or blood in your stool    Fever    Pain in your belly that does not go away    Upset stomach and vomiting, or vomiting blood  [employer] last reviewed this educational content on 6/1/2019 2000-2021 [employer]. All rights reserved. This information is not intended as a substitute for professional medical care. Always follow your healthcare professional's instructions.          Post-Anesthesia Patient Instructions    IMMEDIATELY FOLLOWING SURGERY:  Do not drive or operate machinery for the first twenty four hours after surgery.  Do not make any important decisions for twenty four hours after surgery or while taking narcotic pain medications or sedatives.  If you develop intractable nausea and vomiting or a severe headache please notify your doctor immediately.    FOLLOW-UP:  Please make an appointment with your surgeon as instructed. You do not need to follow up with anesthesia unless specifically instructed to do so.    WOUND CARE INSTRUCTIONS (if applicable):  Keep a dry clean dressing on the anesthesia/puncture wound site if there is drainage.  Once the wound has quit draining you may leave it open to air.  Generally you should leave the bandage intact for twenty four hours unless there is drainage.  If the epidural site drains for more  than 36-48 hours please call the anesthesia department.    QUESTIONS?:  Please feel free to call your physician or the hospital  if you have any questions, and they will be happy to assist you.

## 2021-12-20 ENCOUNTER — MYC REFILL (OUTPATIENT)
Dept: FAMILY MEDICINE | Facility: OTHER | Age: 41
End: 2021-12-20
Payer: COMMERCIAL

## 2021-12-20 DIAGNOSIS — K21.9 GASTROESOPHAGEAL REFLUX DISEASE, UNSPECIFIED WHETHER ESOPHAGITIS PRESENT: ICD-10-CM

## 2021-12-20 DIAGNOSIS — J45.40 MODERATE PERSISTENT ASTHMA WITHOUT COMPLICATION: ICD-10-CM

## 2021-12-20 RX ORDER — OMEPRAZOLE 40 MG/1
40 CAPSULE, DELAYED RELEASE ORAL DAILY
Qty: 90 CAPSULE | Refills: 0 | Status: SHIPPED | OUTPATIENT
Start: 2021-12-20 | End: 2022-03-11

## 2021-12-20 NOTE — TELEPHONE ENCOUNTER
ADVAIR      Last Written Prescription Date:  12-6-21  Last Fill Quantity: 1,   # refills: 0  Last Office Visit: 10-19-21  Future Office visit:       Routing refill request to provider for review/approval because:  Drug not on the [facility], [facility] or [facility] refill protocol or controlled substance      PRILOSEC      Last Written Prescription Date:  11-12-21  Last Fill Quantity: 90,   # refills: 0  Last Office Visit: 10-19-21  Future Office visit:       Routing refill request to provider for review/approval because:  Drug not on the [facility], [facility] or [facility] refill protocol or controlled substance

## 2021-12-21 LAB
PATH REPORT.COMMENTS IMP SPEC: NORMAL
PATH REPORT.COMMENTS IMP SPEC: NORMAL
PATH REPORT.FINAL DX SPEC: NORMAL
PATH REPORT.GROSS SPEC: NORMAL
PATH REPORT.MICROSCOPIC SPEC OTHER STN: NORMAL
PATH REPORT.RELEVANT HX SPEC: NORMAL
PHOTO IMAGE: NORMAL

## 2021-12-28 DIAGNOSIS — J45.40 MODERATE PERSISTENT ASTHMA WITHOUT COMPLICATION: ICD-10-CM

## 2021-12-28 NOTE — TELEPHONE ENCOUNTER
Refill sent to [facility] for #1 inhaler, patient in need of 90 day supply at a time for insurance purposes.

## 2022-02-09 DIAGNOSIS — J45.40 MODERATE PERSISTENT ASTHMA WITHOUT COMPLICATION: ICD-10-CM

## 2022-02-10 RX ORDER — MONTELUKAST SODIUM 10 MG/1
TABLET ORAL
Qty: 90 TABLET | Refills: 3 | Status: SHIPPED | OUTPATIENT
Start: 2022-02-10 | End: 2023-03-09

## 2022-02-10 NOTE — TELEPHONE ENCOUNTER
montelukast    10MG  Last Written Prescription Date:  11/15/21  Last Fill Quantity: 90,   # refills: 0  Last Office Visit: 10/19/21  Future Office visit:    Next 5 appointments (look out 90 days)    Mar 29, 2022 10:00 AM  (Arrive by 9:45 AM)  SHORT with Estelle Reyna CNP  [facility] ([facility] ) [address]  [facility] [address]  [phone]           Routing refill request to provider for review/approval because:  Asthma control assessment score within normal limits in last 6 months    Please review ACT score.     ACT and ATAQ Scores  9/30/2021   10/19/2021    ACT TOTAL SCORE (Goal Greater than or Equal to 20) 19 14

## 2022-03-10 DIAGNOSIS — K21.9 GASTROESOPHAGEAL REFLUX DISEASE, UNSPECIFIED WHETHER ESOPHAGITIS PRESENT: ICD-10-CM

## 2022-03-11 RX ORDER — OMEPRAZOLE 40 MG/1
CAPSULE, DELAYED RELEASE ORAL
Qty: 90 CAPSULE | Refills: 2 | Status: SHIPPED | OUTPATIENT
Start: 2022-03-11 | End: 2022-12-06

## 2022-03-23 DIAGNOSIS — J45.40 MODERATE PERSISTENT ASTHMA WITHOUT COMPLICATION: ICD-10-CM

## 2022-03-23 NOTE — TELEPHONE ENCOUNTER
Advair      Last Written Prescription Date:  12/28/21  Last Fill Quantity: 3,   # refills: 0  Last Office Visit: 10/19/21  Future Office visit:    Next 5 appointments (look out 90 days)    Mar 29, 2022 10:00 AM  (Arrive by 9:45 AM)  SHORT with Estelle Reyna CNP  [facility] ([facility] ) [address]  [facility] [address]  [phone]           Routing refill request to provider for review/approval because:

## 2022-03-28 ENCOUNTER — TELEPHONE (OUTPATIENT)
Dept: FAMILY MEDICINE | Facility: OTHER | Age: 42
End: 2022-03-28
Payer: COMMERCIAL

## 2022-03-28 NOTE — TELEPHONE ENCOUNTER
Received call from patient in regards to Advair [facility]. Patient reports that his insurance will not cover the generic form of medication. Patient provided number for [facility] to clarify refill concern. Phone number is [phone].    This writer called [facility] and spoke with MICHEL Stone, pharmacy tech. Pharmacy requesting approval to dispense 90 day supply. This writer approved 90 day supply.

## 2022-03-28 NOTE — PROGRESS NOTES
"  Assessment & Plan     Moderate persistent asthma without complication  Current ACT is not at goal but patient was sick recently and concerned that this may have driven values artificially high. Will have him repeat ACT screening in one month. Continue with current medications.     Alcoholism (H)  Labs ordered. Patient will return to clinic for lab draw later this week. Will notify patient of results and intervene accordingly. Cancelled on cessation. Declines interventions at this johnnie.   - Hepatic panel (Albumin, ALT, AST, Bili, Alk Phos, TP); Future    Hypertriglyceridemia  Will recheck labs in 6 months. Discussed diet and exercise.       BMI:   Estimated body mass index is 29.41 kg/m  as calculated from the following:    Height as of this encounter: 1.778 m (5' 10\").    Weight as of this encounter: 93 kg (205 lb).   Weight management plan: Discussed healthy diet and exercise guidelines    Luis Alberto Vallejo  DNP Student    Patient is seen in conjunction with NP student.  History is reviewed with patient and pertinent portions of the exam are repeated.  Assessment and plan is reviewed with the patient.      Estelle Reyna, CNP  [facility] CARLITO Ely is a 41 year old who presents for the following health issues     HPI     Hyperlipidemia Follow-Up    Are you regularly taking any medication or supplement to lower your cholesterol?   No    Are you having muscle aches or other side effects that you think could be caused by your cholesterol lowering medication?  No       The 10-year ASCVD risk score (Manan ALCARAZ Jr., et al., 2013) is: 6%    Values used to calculate the score:      Age: 41 years      Sex: Male      Is Non- : No      Diabetic: No      Tobacco smoker: Yes      Systolic Blood Pressure: 136 mmHg      Is BP treated: No      HDL Cholesterol: 43 mg/dL      Total Cholesterol: 201 mg/dL      Asthma Follow-Up  Patient currently takes Advair and Singulair daily " and albuterol as needed. He feels like these medications are working well for him. His wife was recently doing some work around the house that included sanding and painting which made his symptoms flare. Patient had a cold about two weeks ago which has since resolved.     Was ACT completed today?    Yes    ACT Total Scores 3/29/2022   ACT TOTAL SCORE (Goal Greater than or Equal to 20) 14   In the past 12 months, how many times did you visit the emergency room for your asthma without being admitted to the hospital? 0   In the past 12 months, how many times were you hospitalized overnight because of your asthma? 0       How many days per week do you miss taking your asthma controller medication?  2    Please describe any recent triggers for your asthma: dust mites, humidity, cold air and dust    Have you had any Emergency Room Visits, Urgent Care Visits, or Hospital Admissions since your last office visit?  No           How many servings of fruits and vegetables do you eat daily?  2-3    On average, how many sweetened beverages do you drink each day (Examples: soda, juice, sweet tea, etc.  Do NOT count diet or artificially sweetened beverages)?   4    How many days per week do you exercise enough to make your heart beat faster? 3 or less    How many minutes a day do you exercise enough to make your heart beat faster? 9 or less    How many days per week do you miss taking your medication? 0      Review of Systems   CONSTITUTIONAL: NEGATIVE for fever, chills, change in weight  INTEGUMENTARY/SKIN: NEGATIVE for worrisome rashes, moles or lesions  EYES: NEGATIVE for vision changes or irritation  ENT/MOUTH: NEGATIVE for ear, mouth and throat problems  RESP: NEGATIVE for significant cough or SOB  CV: NEGATIVE for chest pain, palpitations or peripheral edema  GI: NEGATIVE for nausea, abdominal pain, heartburn, or change in bowel habits  : NEGATIVE for frequency, dysuria, or hematuria  MUSCULOSKELETAL: NEGATIVE for  "significant arthralgias or myalgia  NEURO: NEGATIVE for weakness, dizziness or paresthesias  PSYCHIATRIC: NEGATIVE for changes in mood or affect      Objective    BP (!) 140/86 (BP Location: Right arm, Patient Position: Sitting, Cuff Size: Adult Large)   Pulse 90   Temp 97.3  F (36.3  C) (Tympanic)   Resp 16   Ht 1.778 m (5' 10\")   Wt 93 kg (205 lb)   SpO2 98%   BMI 29.41 kg/m    Body mass index is 29.41 kg/m .     Physical Exam   GENERAL: healthy, alert and no distress  EYES: Eyes grossly normal to inspection, PERRL and conjunctivae and sclerae normal  HENT: ear canals and TM's normal, nose and mouth without ulcers or lesions  NECK: no adenopathy, no asymmetry, masses, or scars and thyroid normal to palpation  RESP: lungs clear to auscultation - no rales, rhonchi or wheezes  CV: regular rate and rhythm, normal S1 S2, no S3 or S4, no murmur, click or rub, no peripheral edema and peripheral pulses strong  MS: no gross musculoskeletal defects noted, no edema  PSYCH: mentation appears normal, affect normal/bright          "

## 2022-03-29 ENCOUNTER — OFFICE VISIT (OUTPATIENT)
Dept: FAMILY MEDICINE | Facility: OTHER | Age: 42
End: 2022-03-29
Attending: NURSE PRACTITIONER
Payer: COMMERCIAL

## 2022-03-29 VITALS
TEMPERATURE: 97.3 F | HEART RATE: 90 BPM | WEIGHT: 205 LBS | SYSTOLIC BLOOD PRESSURE: 136 MMHG | OXYGEN SATURATION: 98 % | HEIGHT: 70 IN | RESPIRATION RATE: 16 BRPM | DIASTOLIC BLOOD PRESSURE: 82 MMHG | BODY MASS INDEX: 29.35 KG/M2

## 2022-03-29 DIAGNOSIS — E78.1 HYPERTRIGLYCERIDEMIA: ICD-10-CM

## 2022-03-29 DIAGNOSIS — F10.20 ALCOHOLISM (H): ICD-10-CM

## 2022-03-29 DIAGNOSIS — J45.40 MODERATE PERSISTENT ASTHMA WITHOUT COMPLICATION: Primary | ICD-10-CM

## 2022-03-29 PROCEDURE — 99213 OFFICE O/P EST LOW 20 MIN: CPT | Performed by: NURSE PRACTITIONER

## 2022-03-29 ASSESSMENT — PAIN SCALES - GENERAL: PAINLEVEL: SEVERE PAIN (6)

## 2022-03-29 ASSESSMENT — ASTHMA QUESTIONNAIRES: ACT_TOTALSCORE: 14

## 2022-03-29 NOTE — NURSING NOTE
"Chief Complaint   Patient presents with     Hyperlipidemia     Asthma       Initial BP (!) 140/86 (BP Location: Right arm, Patient Position: Sitting, Cuff Size: Adult Large)   Pulse 90   Temp 97.3  F (36.3  C) (Tympanic)   Resp 16   Ht 1.778 m (5' 10\")   Wt 93 kg (205 lb)   SpO2 98%   BMI 29.41 kg/m   Estimated body mass index is 29.41 kg/m  as calculated from the following:    Height as of this encounter: 1.778 m (5' 10\").    Weight as of this encounter: 93 kg (205 lb).  Medication Reconciliation: complete  Whit Mtz LPN    "

## 2022-04-07 ENCOUNTER — VIRTUAL VISIT (OUTPATIENT)
Dept: SLEEP MEDICINE | Facility: CLINIC | Age: 42
End: 2022-04-07
Attending: FAMILY MEDICINE
Payer: COMMERCIAL

## 2022-04-07 VITALS — WEIGHT: 198 LBS | HEIGHT: 70 IN | BODY MASS INDEX: 28.35 KG/M2

## 2022-04-07 DIAGNOSIS — F51.04 CHRONIC INSOMNIA: Primary | ICD-10-CM

## 2022-04-07 DIAGNOSIS — G47.21 CIRCADIAN RHYTHM SLEEP DISORDER, DELAYED SLEEP PHASE TYPE: ICD-10-CM

## 2022-04-07 PROCEDURE — 90791 PSYCH DIAGNOSTIC EVALUATION: CPT | Mod: 95 | Performed by: PSYCHOLOGIST

## 2022-04-07 ASSESSMENT — SLEEP AND FATIGUE QUESTIONNAIRES
HOW LIKELY ARE YOU TO NOD OFF OR FALL ASLEEP WHILE SITTING INACTIVE IN A PUBLIC PLACE: WOULD NEVER DOZE
HOW LIKELY ARE YOU TO NOD OFF OR FALL ASLEEP WHILE LYING DOWN TO REST IN THE AFTERNOON WHEN CIRCUMSTANCES PERMIT: HIGH CHANCE OF DOZING
HOW LIKELY ARE YOU TO NOD OFF OR FALL ASLEEP WHILE WATCHING TV: MODERATE CHANCE OF DOZING
HOW LIKELY ARE YOU TO NOD OFF OR FALL ASLEEP IN A CAR, WHILE STOPPED FOR A FEW MINUTES IN TRAFFIC: WOULD NEVER DOZE
HOW LIKELY ARE YOU TO NOD OFF OR FALL ASLEEP WHILE SITTING AND TALKING TO SOMEONE: WOULD NEVER DOZE
HOW LIKELY ARE YOU TO NOD OFF OR FALL ASLEEP WHILE SITTING QUIETLY AFTER LUNCH WITHOUT ALCOHOL: SLIGHT CHANCE OF DOZING
HOW LIKELY ARE YOU TO NOD OFF OR FALL ASLEEP WHILE SITTING AND READING: WOULD NEVER DOZE
HOW LIKELY ARE YOU TO NOD OFF OR FALL ASLEEP WHEN YOU ARE A PASSENGER IN A CAR FOR AN HOUR WITHOUT A BREAK: WOULD NEVER DOZE

## 2022-04-07 ASSESSMENT — PAIN SCALES - GENERAL: PAINLEVEL: NO PAIN (0)

## 2022-04-07 NOTE — PROGRESS NOTES
Solis is a 41 year old who is being evaluated via a billable video visit.      How would you like to obtain your AVS? MyChart  If the video visit is dropped, the invitation should be resent by: Send to e-mail at: [email or web address]  Will anyone else be joining your video visit? Temi Lynn    Video Start Time: 11:10 AM  Video-Visit Details    Type of service:  Video Visit    Video End Time:12:01 PM    Originating Location (pt. Location): Home    Distant Location (provider location):  [facility]     Platform used for Video Visit: [facility]     SLEEP MEDICINE CONSULTATION  Sleep Psychology  2022    Name: Solis Prescott MRN# [id number]   Age: 41 year old YOB: 1980     Date of Consultation: 2022  Consultation is requested by: Kofi Richey MD  [address] [place] [id number]  Primary care provider: Estelle Reyna    Reason for Sleep Consultation     Solis Prescott is a 41 year old male seen today for a behavioral sleep medicine consultation because of insomnia    Assessment and Plan     Sleep Diagnoses:       Chronic insomnia  Circadian rhythm sleep disorder, delayed sleep phase type  Mild Sleep Apnea, Positional  Clinical Impressions:    Solis Prescott was seen for a sleep psychology consultation and possible behavioral sleep intervention and treatment.  This evaluation suggests a multi factored and chronic insomnia associated with fairly prominent delayed sleep phase circadian rhythm appear to emerge in late adolescence.  Marked variation in sleep offset from weekend to weekday likely contributes to maintenance of insomnia along with other psychophysiologic factors.  Mild, mostly positional sleep apnea may also contribute to poor quality sleep.    Recommendations and Counselin.  Initiate a course of cognitive behavioral therapy accounting for a fairly prominent delayed sleep phase circadian rhythm.  Patient was advised to  implement and maintain a consistent sleep schedule, most importantly, maintaining a consistent wake time of 7 AM, sleep window of 8 hours and earliest time to bed at 11 PM.    2.  Patient agreed to get bright morning ambient light or bright light box o 30 minutes Bright light box specs:  10,000 Lux, full-spectrum UV protection light.  Patient was advised to consult with his primary physician before initiating use).    3.  Patient was advised to against using screens and devices within 1 hour of bedtime.    4.  Implement relaxing evening routine   5.  Follow-up with his primary care physician or Dr. Richey  regarding use of clonazepam.  Recommend he continue to avoid nightly use as this may foster tolerance.    Solis was provided information about the pathophysiology of insomnia, psychophysiological factors contributing to the onset and maintenance of insomnia and how co-occurring medical conditions and intrinsic sleep disorders can affect sleep.  Treatment options were discussed  as applicable to patient specific sleep concerns and symptoms. The benefits and potential early side effects of treatment including increased daytime sleepiness were discussed.     Patient was advised to consult with their prescribing provider around use of or changes to prescription sleep medication.  Patient was counseled on the importance of avoiding driving if drowsy.    Follow-up: 6 weeks     History of Present Sleep Complaint     Solis Prescott is a 41 year old year old male with a relevant medical history of  Asthma Mild positional sleep apnea who presents with a longstanding history of non-restorative sleep beginning in 2010.  His girlfriend at the time noticed significant restlessness at night.  He eventually went on to have a sleep study in 2012 which indicated snoring and mid positional EDWIGE.    Patient went on to purchase an expensive bed which seemed to help initially in 2012.  He was prescribed trazodone but did not find it  "effective. He was then prescribed clonazepam and has used on and off since his 2012 sleep study.    He is currently using clonazepam nightly, 0.5-1.0 at bedtime.      Patient reports he he is a 'night owl\" and finds it difficulty waking or getting up in the morning.  He indicates his ideal sleep time would be 2-10 am.      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  Source of Sleep Estimates:  Verbal Self-report    Time to Bed:  11:00 PM - 11:30 PM,  2 AM - 3 AM, on weekends  Activity in Bed (stimulus control): use electronics  Sleep Latency: 15-30 minutes  Times awakened: 1-2  Total Time Awake After Sleep Onset: 30-60 minutes  Time Up for the Day: 630-7 AM, 8-10 AM on weekends  Total Time in Bed: Varies, 7-10 hours  Estimated Total Sleep Time: Varies, 6-7 hours  Sleep Efficiency Estimate: Varies, 70-85%    Naps: Less than once a week, Duration 15-30 minutes      SLEEP HYGIENE:    Behaviors affecting Sleep   Uses computers or electronics within an hour before bedtime, Extends time in bed longer if after poor night of sleep, Sleeps in on weekends    Sleep Environment:    Bedroom is quiet, comfortable and dark, Regular bedpartner    Substance Use:    Caffeine: None reported   Alcohol: Occasional social drinking  Nicotine: None  Other substances:: None reported    PREVIOUS SLEEP EVALUATIONS:      In lab PSG in 2012, subsequent follow-up treatment included a trial of Trazodone followed by clonazepam.  Findings  SCALES      EPWORTH SLEEPINESS SCALE    Likeliness of dozing or falling  asleep:    Sitting and reading: Would never doze    Watching TV: Moderate chance of dozing    Sitting, inactive in a public place (e.g. a theatre or a meeting): Would never doze    As a passenger in a car for an hour without a break: Would never doze    Lying down to rest in the afternoon when circumstances permit: High chance of dozing    Sitting and talking to someone: Would never doze    Sitting quietly after a lunch without " "alcohol: Slight chance of dozing    In a car, while stopped for a few minutes in traffic: Would never doze    Total score - [place]: 6      INSOMNIA SEVERITY INDEX (KEVEN)      The Insomnia Severity Index measures the severity of insomnia symptoms over the past two weeks.    1. Difficulty falling asleep: Mild    2. Difficulty staying asleep: Mild    3. Problems waking up too early: Moderate    4. How SATISFIED/DISSATISFIED are youwith your CURRENT sleep pattern? Dissatisfied    5. How NOTICEABLE to others do you think your sleep problem is in terms of impairing the quality of your life? Somewhat      6. How WORRIED/DISTRESSED are you about your sleep problem? Somewhat    7. To what extent do you consider your sleep problem to INTERFERE with your daily functioning (e.g. daytime fatigue, mood, ability to functon at work/daily chores, concentration, memory, mood, etc.) CURRENTLY?   Much    KEVEN Total Score: 14    Guidelines for Scoring/Interpretation:     0-7 = No clinically significant insomnia   8-14 = Subthreshold insomnia   15-21 = Clinical insomnia (moderate severity)  22-28 = Clinical insomnia (severe)      Vitals     Ht 1.778 m (5' 10\")   Wt 89.8 kg (198 lb)   BMI 28.41 kg/m       Medical History     Allergies:    No Known Allergies    Medications:    Current Outpatient Medications   Medication Sig Dispense Refill     ADVAIR DISKUS 250-50 MCG/DOSE inhaler USE 1 INHALATION EVERY 12 HOURS 1 each 3     albuterol (PROAIR HFA/PROVENTIL HFA/VENTOLIN HFA) 108 (90 Base) MCG/ACT inhaler Inhale 2 puffs into the lungs       clonazePAM (KLONOPIN) 0.5 MG tablet Take 1-2 tablets by mouth 15-30 minutes before bed. 60 tablet 5     montelukast (SINGULAIR) 10 MG tablet TAKE 1 TABLET AT BEDTIME 90 tablet 3     omeprazole (PRILOSEC) 40 MG DR capsule TAKE 1 CAPSULE DAILY 90 capsule 2     triamcinolone (KENALOG) 0.1 % external cream          Problem List:  Patient Active Problem List    Diagnosis Date Noted     Alcoholism (H) " 07/08/2020     Priority: Medium     Hypertriglyceridemia 07/08/2020     Priority: Medium     Tobacco abuse 07/08/2020     Priority: Medium     Allergic rhinitis 11/23/2012     Priority: Medium     Asthma 11/23/2012     Priority: Medium     Impaired fasting glucose 11/23/2012     Priority: Medium     Insomnia 11/23/2012     Priority: Medium        Past Medical/Surgical History:  No past medical history on file.  Patient Active Problem List    Diagnosis Date Noted     Alcoholism (H) 07/08/2020     Priority: Medium     Hypertriglyceridemia 07/08/2020     Priority: Medium     Tobacco abuse 07/08/2020     Priority: Medium     Allergic rhinitis 11/23/2012     Priority: Medium     Asthma 11/23/2012     Priority: Medium     Impaired fasting glucose 11/23/2012     Priority: Medium     Insomnia 11/23/2012     Priority: Medium     Patient Active Problem List   Diagnosis     Alcoholism (H)     Allergic rhinitis     Asthma     Hypertriglyceridemia     Impaired fasting glucose     Insomnia     Tobacco abuse        Mental Health History     Prior Mental Health Diagnosis:   None reported    Current Mental Health Treatment:   None reported    Chemical Abuse/Treatment:    None reported      Family History     Family History   Problem Relation Age of Onset     Alcoholism Mother      Depression Mother      Mental Illness Mother      Hearing Loss Father      Depression Sister        Social History     Social History     Socioeconomic History     Marital status:      Spouse name: None     Number of children: None     Years of education: None     Highest education level: None   Occupational History     None   Tobacco Use     Smoking status: Current Every Day Smoker     Packs/day: 0.50     Years: 26.00     Pack years: 13.00     Types: Cigarettes, Other     Smokeless tobacco: Never Used   Substance and Sexual Activity     Alcohol use: Yes     Alcohol/week: 6.0 standard drinks     Types: 6 Cans of beer per week     Comment: weekly      Drug use: Never     Sexual activity: None   Other Topics Concern     None   Social History Narrative     None     Social Determinants of Health     Financial Resource Strain: Not on file   Food Insecurity: Not on file   Transportation Needs: Not on file   Physical Activity: Not on file   Stress: Not on file   Social Connections: Not on file   Intimate Partner Violence: Not on file   Housing Stability: Not on file       Work FT in IT     Mental Status Examination     Solis presented as oriented X3 with speech and language intact.  The patient was cooperative throughout the evaluation with no signs of hallucinations, delusions, loosening of associations or other thought disturbance.  Mood was normal Affect was congruent with mood. Insight and judgement were intact.  Memory appeared intact for recent and remote elements.  There was no report of suicidal ideation, intention or plan. Attention and concentration were within normal.        Johan Rojas, [place], LP, [facility]  Diplomate, Behavioral Sleep Medicine  [facility]      Copy:   Estelle Reyna MD  [address] [place] [address]    Note: This dictation was created using voice recognition software. This document may contain an error not identified before finalizing the document. If the error changes the accuracy of the document, I would appreciate it being brought to my attention.

## 2022-04-07 NOTE — PATIENT INSTRUCTIONS
1.  Initiate a course of cognitive behavioral therapy accounting for a fairly prominent delayed sleep phase circadian rhythm.  Patient was advised to implement and maintain a consistent sleep schedule, most importantly, maintaining a consistent wake time of 7 AM, sleep window of 8 hours and earliest time to bed at 11 PM.    2.  Patient agreed to get bright morning ambient light or consider using for 30minutes a day in the morning upon waking (10,000 Lux, full-spectrum UV protection light.  Patient was advised to consult with his primary physician before initiating use).    3.  Patient was advised to against using screens and devices within 1 hour of bedtime.    4.  Implement relaxing evening routine accident of use of screens and devices.    5.  Follow-up with his primary care physician or  regarding use of the clonazepam.  Recommend he continue to avoid nightly use as this may foster tolerance.

## 2022-04-21 ENCOUNTER — LAB (OUTPATIENT)
Dept: LAB | Facility: OTHER | Age: 42
End: 2022-04-21
Payer: COMMERCIAL

## 2022-04-21 DIAGNOSIS — F10.20 ALCOHOLISM (H): ICD-10-CM

## 2022-04-21 LAB
ALBUMIN SERPL-MCNC: 4 G/DL (ref 3.4–5)
ALP SERPL-CCNC: 58 U/L (ref 40–150)
ALT SERPL W P-5'-P-CCNC: 52 U/L (ref 0–70)
AST SERPL W P-5'-P-CCNC: 31 U/L (ref 0–45)
BILIRUB DIRECT SERPL-MCNC: 0.2 MG/DL (ref 0–0.2)
BILIRUB SERPL-MCNC: 0.5 MG/DL (ref 0.2–1.3)
PROT SERPL-MCNC: 7.4 G/DL (ref 6.8–8.8)

## 2022-04-21 PROCEDURE — 80076 HEPATIC FUNCTION PANEL: CPT

## 2022-04-21 PROCEDURE — 36415 COLL VENOUS BLD VENIPUNCTURE: CPT

## 2022-05-13 ENCOUNTER — VIRTUAL VISIT (OUTPATIENT)
Dept: SLEEP MEDICINE | Facility: CLINIC | Age: 42
End: 2022-05-13
Payer: COMMERCIAL

## 2022-05-13 ENCOUNTER — TELEPHONE (OUTPATIENT)
Dept: FAMILY MEDICINE | Facility: OTHER | Age: 42
End: 2022-05-13
Payer: COMMERCIAL

## 2022-05-13 VITALS — BODY MASS INDEX: 28.06 KG/M2 | WEIGHT: 196 LBS | HEIGHT: 70 IN

## 2022-05-13 DIAGNOSIS — F51.04 CHRONIC INSOMNIA: Primary | ICD-10-CM

## 2022-05-13 DIAGNOSIS — G47.21 CIRCADIAN RHYTHM SLEEP DISORDER, DELAYED SLEEP PHASE TYPE: ICD-10-CM

## 2022-05-13 PROCEDURE — 90832 PSYTX W PT 30 MINUTES: CPT | Mod: 95 | Performed by: PSYCHOLOGIST

## 2022-05-13 ASSESSMENT — SLEEP AND FATIGUE QUESTIONNAIRES
HOW LIKELY ARE YOU TO NOD OFF OR FALL ASLEEP WHILE SITTING INACTIVE IN A PUBLIC PLACE: WOULD NEVER DOZE
HOW LIKELY ARE YOU TO NOD OFF OR FALL ASLEEP IN A CAR, WHILE STOPPED FOR A FEW MINUTES IN TRAFFIC: WOULD NEVER DOZE
HOW LIKELY ARE YOU TO NOD OFF OR FALL ASLEEP WHEN YOU ARE A PASSENGER IN A CAR FOR AN HOUR WITHOUT A BREAK: WOULD NEVER DOZE
HOW LIKELY ARE YOU TO NOD OFF OR FALL ASLEEP WHILE WATCHING TV: MODERATE CHANCE OF DOZING
HOW LIKELY ARE YOU TO NOD OFF OR FALL ASLEEP WHILE SITTING QUIETLY AFTER LUNCH WITHOUT ALCOHOL: SLIGHT CHANCE OF DOZING
HOW LIKELY ARE YOU TO NOD OFF OR FALL ASLEEP WHILE LYING DOWN TO REST IN THE AFTERNOON WHEN CIRCUMSTANCES PERMIT: HIGH CHANCE OF DOZING
HOW LIKELY ARE YOU TO NOD OFF OR FALL ASLEEP WHILE SITTING AND TALKING TO SOMEONE: WOULD NEVER DOZE
HOW LIKELY ARE YOU TO NOD OFF OR FALL ASLEEP WHILE SITTING AND READING: MODERATE CHANCE OF DOZING

## 2022-05-13 ASSESSMENT — ASTHMA QUESTIONNAIRES: ACT_TOTALSCORE: 22

## 2022-05-13 ASSESSMENT — PAIN SCALES - GENERAL: PAINLEVEL: NO PAIN (0)

## 2022-05-13 NOTE — PROGRESS NOTES
Solis is a 41 year old who is being evaluated via a billable video visit.      How would you like to obtain your AVS? MyChart  If the video visit is dropped, the invitation should be resent by: Send to e-mail at: [email or web address]  Will anyone else be joining your video visit? Temi Lynn      Video Start Time: 11:32 AM  Video-Visit Details    Type of service:  Video Visit    Video End Time:11:58 AM    Originating Location (pt. Location): Home    Distant Location (provider location):  [facility]     Platform used for Video Visit: [facility]     SLEEP [facility] VIRTUAL VIDEO FOLLOW-UP VISIT  Sleep Psychology    Patient Name: Solis Prescott  MRN:  [id number]  Date of Service: May 13, 2022       Subjective Report     Solis Prescott  returns for a telehealth video visit to discuss progress in implementing behavioral strategies for the management of insomnia and delayed sleep phase disorder.  Patient consent for initiation of video visit was obtained and documented prior to initiation of visit.     Solis reports he has been trying to avoid staying up late on weekends but notes that activities such as working on vehicles, marital discord, etc late in evening have affect his adherence to recommendations.  Also late night evening alcohol use.    He continues to use clonazepam nightly.  He does not use with alcohol.     .He is keeping weekday wake time goal of 6:30 AM but on weekends he is staying in bed sleeping until 9-10 am.  He sites social activity and marital conflict late at night.     Sleep Data:     Source of Sleep Estimates:  CBT-I  Lance    Average Time in Bed: 6.3  Average Total Sleep Time:  5.7 hrs  Sleep Efficiency: 80%    EPWORTH SLEEPINESS SCALE    Sitting and reading Moderate chance of dozing   Watching TV Moderate chance of dozing   Sitting, inactive in a public place (theatre or mtg.) Would never doze    As a passenger in a car Would never doze   Lying down to rest in  "the afternoon when circumstance permit High chance of dozing   Sitting and talking to someone Would never doze   Sitting quietly after lunch without alcohol Slight chance of dozing   In a car, while stopped for a few minutes in traffic Would never doze   TOTAL SCORE (nl <11) 8     INSOMNIA SEVERITY INDEX     Difficulty falling asleep Mild   Difficult staying asleep Moderate   Problems waking up to early Mild   How SATISFIED/DISSATISFIED are you with your CURRENT sleep pattern? Dissatisfied   How NOTICEABLE to others do you think your sleep pattern is in terms of your quality of life? Somewhat   How WORRIED/DISTRESSED are you about your current sleep pattern? Somewhat   To what extent do you consider your sleep problem to INTERFERE with your daily fuctioning(e.g. daytime fatigue, mood, ability to function at work/daily chores, concentration, mood,etc.) CURRENTLY? Somewhat   INSOMNIA SEVERITY INDEX TOTAL SCORE 13    Absence of insomnia (0-7); sub-threshold insomnia (8-14); moderate insomnia (15-21); and severe insomnia (22-28)       Interventions     Strategies and recommendations including Stimulus control therapy and Circadian rhythm disorder treatment and management were discussed today.       Vital Signs     Ht 1.778 m (5' 10\")   Wt 88.9 kg (196 lb)   BMI 28.12 kg/m       Mental Status     Orientation:  X3  Mood:  normal  Affect:  Congruent with mood  Speech/Language:  Normal  Thought Process: Intact  Associations:  Normal  Thought Content: Normal  Patient does not report any suicidal ideation, intention or plan.    Diagnostic Impressions and Plan        Chronic insomnia  Circadian rhythm sleep disorder, delayed sleep phase type    Patient has been somewhat successful in reducing average time in bed but less successful in establishing a consistent wake time.  This is likely the result of the delayed sleep phase circadian rhythm.  Situational factors including marital conflict and habit of late evening " socializing and activities also generate a degree of sleep deprivation on weekdays and at a later time to bed on weekends.  Uses alcohol in the evenings fairly frequently and states that he has as directed avoid use of clonazepam in the evenings he is drinking.  Patient was also cautioned on use of the clonazepam without allowing for a 7-8-hour sleep window.    Plan:  Continue current sleep schedule and plan Focus on conversation with wife to get support on establishing and maintaining his earlier bedtime.  Specifically avoiding waking each other and avoiding later evening television watching or activities that delay sleep onset.    Follow-up: 6 weeks      Johan Rojas PsyD, [place], DBSM  Diplomate, Behavioral Sleep Medicine  [facility]      Note: This dictation was created using voice recognition software. This document may contain an error not identified before finalizing the document. If the error changes the accuracy of the document, I would appreciate it being brought to my attention.

## 2022-05-23 ENCOUNTER — MYC MEDICAL ADVICE (OUTPATIENT)
Dept: SLEEP MEDICINE | Facility: HOSPITAL | Age: 42
End: 2022-05-23
Payer: COMMERCIAL

## 2022-05-23 DIAGNOSIS — G47.00 INSOMNIA, UNSPECIFIED TYPE: ICD-10-CM

## 2022-05-26 RX ORDER — CLONAZEPAM 0.5 MG/1
TABLET ORAL
Qty: 60 TABLET | Refills: 5 | Status: SHIPPED | OUTPATIENT
Start: 2022-05-26 | End: 2023-01-23

## 2022-06-24 ENCOUNTER — VIRTUAL VISIT (OUTPATIENT)
Dept: SLEEP MEDICINE | Facility: CLINIC | Age: 42
End: 2022-06-24
Payer: COMMERCIAL

## 2022-06-24 VITALS — BODY MASS INDEX: 27.92 KG/M2 | WEIGHT: 195 LBS | HEIGHT: 70 IN

## 2022-06-24 DIAGNOSIS — G47.21 CIRCADIAN RHYTHM SLEEP DISORDER, DELAYED SLEEP PHASE TYPE: ICD-10-CM

## 2022-06-24 DIAGNOSIS — F51.04 CHRONIC INSOMNIA: Primary | ICD-10-CM

## 2022-06-24 PROCEDURE — 90832 PSYTX W PT 30 MINUTES: CPT | Mod: 95 | Performed by: PSYCHOLOGIST

## 2022-06-24 ASSESSMENT — SLEEP AND FATIGUE QUESTIONNAIRES
HOW LIKELY ARE YOU TO NOD OFF OR FALL ASLEEP WHILE SITTING AND READING: MODERATE CHANCE OF DOZING
HOW LIKELY ARE YOU TO NOD OFF OR FALL ASLEEP WHEN YOU ARE A PASSENGER IN A CAR FOR AN HOUR WITHOUT A BREAK: WOULD NEVER DOZE
HOW LIKELY ARE YOU TO NOD OFF OR FALL ASLEEP WHILE LYING DOWN TO REST IN THE AFTERNOON WHEN CIRCUMSTANCES PERMIT: HIGH CHANCE OF DOZING
HOW LIKELY ARE YOU TO NOD OFF OR FALL ASLEEP WHILE WATCHING TV: MODERATE CHANCE OF DOZING
HOW LIKELY ARE YOU TO NOD OFF OR FALL ASLEEP WHILE SITTING AND TALKING TO SOMEONE: WOULD NEVER DOZE
HOW LIKELY ARE YOU TO NOD OFF OR FALL ASLEEP IN A CAR, WHILE STOPPED FOR A FEW MINUTES IN TRAFFIC: WOULD NEVER DOZE
HOW LIKELY ARE YOU TO NOD OFF OR FALL ASLEEP WHILE SITTING INACTIVE IN A PUBLIC PLACE: WOULD NEVER DOZE
HOW LIKELY ARE YOU TO NOD OFF OR FALL ASLEEP WHILE SITTING QUIETLY AFTER LUNCH WITHOUT ALCOHOL: SLIGHT CHANCE OF DOZING

## 2022-06-24 ASSESSMENT — PAIN SCALES - GENERAL: PAINLEVEL: NO PAIN (0)

## 2022-06-24 NOTE — PROGRESS NOTES
"Solis is a 41 year old who is being evaluated via a billable video visit.      How would you like to obtain your AVS? MyChart  If the video visit is dropped, the invitation should be resent by: Send to e-mail at: [email or web address]  Will anyone else be joining your video visit? Temi Lynn      Video-Visit Details    Video Start Time: 11:35 AM    Type of service:  Video Visit    Video End Time:11:55 AM    Originating Location (pt. Location): Home    Distant Location (provider location):  [facility]     Platform used for Video Visit: [facility]     SLEEP [facility] VIRTUAL VIDEO FOLLOW-UP VISIT  Sleep Psychology    Patient Name: Solis Prescott  MRN:  [id number]  Date of Service: Jun 24, 2022       Subjective Report     Solis Prescott  returns for a telehealth video visit to discuss progress in implementing behavioral strategies for the management of insomnia.  Patient consent for initiation of video visit was obtained and documented prior to initiation of visit.     Solis reports he has been getting up earlier on weekends, now by about 730 AM.  Using clonazepam most nights..    He notices that by maintaining a more consistent sleep schedule on weekends and he is less tired on Monday and Tuesdays.  \"There is less recovery time\".  He is more aware of external factors or situational factors affecting sleep including when he has a fight with his spouse or when his asthma flares.         .     Sleep Data:     Source of Sleep Estimates:  Verbal Self-report    Average Time in Bed: 8.2  Average Total Sleep Time: 7.5 hrs  Sleep Efficiency: Greater than 85%    EPWORTH SLEEPINESS SCALE    Sitting and reading 2   Watching TV 2   Sitting, inactive in a public place (theatre or mtg.) 0    As a passenger in a car 0   Lying down to rest in the afternoon when circumstance permit 3   Sitting and talking to someone 0   Sitting quietly after lunch without alcohol 1   In a car, while stopped for a few " "minutes in traffic 0   TOTAL SCORE (nl <11) 8     INSOMNIA SEVERITY INDEX     Difficulty falling asleep 1   Difficult staying asleep 2   Problems waking up to early 2   How SATISFIED/DISSATISFIED are you with your CURRENT sleep pattern? 4   How NOTICEABLE to others do you think your sleep pattern is in terms of your quality of life? 1   How WORRIED/DISTRESSED are you about your current sleep pattern? 3   To what extent do you consider your sleep problem to INTERFERE with your daily fuctioning(e.g. daytime fatigue, mood, ability to function at work/daily chores, concentration, mood,etc.) CURRENTLY? 2   INSOMNIA SEVERITY INDEX TOTAL SCORE 15    Absence of insomnia (0-7); sub-threshold insomnia (8-14); moderate insomnia (15-21); and severe insomnia (22-28)       Interventions     Strategies and recommendations including Stimulus control therapy and Circadian rhythm disorder treatment and management were discussed today.       Vital Signs     Ht 1.778 m (5' 10\")   Wt 88.5 kg (195 lb)   BMI 27.98 kg/m       Mental Status     Orientation:  X3  Mood:  normal  Affect:  Congruent with mood  Speech/Language:  Normal  Thought Process: Intact  Associations:  Normal  Thought Content: Normal  Patient does not report any suicidal ideation, intention or plan.    Diagnostic Impressions and Plan        Chronic insomnia  Circadian rhythm sleep disorder, delayed sleep phase type    Solis Prescott reports improvement in overall sleep quality and reduced sleep offset variability and more consistent in earlier a week and wake time that is now only 1-1.5 hours later than weekdays.  This is resulted overall and feeling better day-to-day during the week.    Plan:  Continue current sleep schedule and plan    Follow-up: as needed if symptoms recur      Johan Rojas PsyD, [place], DBSM  Diplomate, Behavioral Sleep Medicine  [facility]      Note: This dictation was created using voice recognition software. This " document may contain an error not identified before finalizing the document. If the error changes the accuracy of the document, I would appreciate it being brought to my attention.

## 2022-07-13 DIAGNOSIS — J45.40 MODERATE PERSISTENT ASTHMA WITHOUT COMPLICATION: Primary | ICD-10-CM

## 2022-07-13 RX ORDER — ALBUTEROL SULFATE 90 UG/1
2 AEROSOL, METERED RESPIRATORY (INHALATION) EVERY 4 HOURS PRN
Qty: 18 G | Refills: 3 | Status: SHIPPED | OUTPATIENT
Start: 2022-07-13 | End: 2022-08-08

## 2022-07-13 NOTE — TELEPHONE ENCOUNTER
Patient is out of inhaler. PCP: Axel- Out of Office     albuterol (PROAIR HFA/PROVENTIL HFA/VENTOLIN HFA) 108 (90 Base) MCG/ACT inhaler        Last Written Prescription Date:  7/2/21  Last Fill Quantity: ,   # refills:   Last Office Visit: 3/29/22  Future Office visit:       Routing refill request to provider for review/approval because:    Medication is reported/historical

## 2022-08-08 DIAGNOSIS — J45.40 MODERATE PERSISTENT ASTHMA WITHOUT COMPLICATION: ICD-10-CM

## 2022-08-08 RX ORDER — ALBUTEROL SULFATE 90 UG/1
2 AEROSOL, METERED RESPIRATORY (INHALATION) EVERY 4 HOURS PRN
Qty: 18 G | Refills: 3 | Status: SHIPPED | OUTPATIENT
Start: 2022-08-08 | End: 2023-04-26

## 2022-09-09 DIAGNOSIS — J45.40 MODERATE PERSISTENT ASTHMA WITHOUT COMPLICATION: ICD-10-CM

## 2022-09-14 RX ORDER — FLUTICASONE PROPIONATE AND SALMETEROL 50; 250 UG/1; UG/1
POWDER RESPIRATORY (INHALATION)
Qty: 1 EACH | Refills: 3 | Status: SHIPPED | OUTPATIENT
Start: 2022-09-14 | End: 2023-01-16

## 2022-09-14 NOTE — TELEPHONE ENCOUNTER
ADVAIR DISKUS 250-50 MCG/ACT inhaler [Pharmacy Med Name: ADVAIR DISKUS 60'S 250/50MCG]      Last Written Prescription Date:  9/9/22  Last Fill Quantity: ,   # refills: 3  Last Office Visit: 3/29/22  Future Office visit:       Routing refill request to provider for review/approval because:  Drug not on the [facility], P or  Health refill protocol or controlled substance

## 2022-09-21 NOTE — PROGRESS NOTES
"  Assessment & Plan     Moderate persistent asthma without complication  Stable. Continue on current advair diskus 250-50 and albuterol as well as Montelukast Follow up in 6 months    Alcoholism (H)  Noted.  - Comprehensive metabolic panel (BMP + Alb, Alk Phos, ALT, AST, Total. Bili, TP)    Nicotine dependence, uncomplicated, unspecified nicotine product type  Accepting of quit plan.   - [place] Quit Partner Referral    Encounter for hepatitis C screening test for low risk patient  - Hepatitis C Screen Reflex to HCV RNA Quant and Genotype    Lipid screening  - Lipid Profile (Chol, Trig, HDL, LDL calc)    Impaired fasting glucose  - Hemoglobin A1c    Screening for HIV (human immunodeficiency virus)  - HIV Antigen Antibody Combo      Ordering of each unique test  No LOS data to display   Time spent doing chart review, history and exam, documentation and further activities per the note     Nicotine/Tobacco Cessation:  He reports that he has been smoking cigarettes and other. He has a 13.00 pack-year smoking history. He has never used smokeless tobacco.  Nicotine/Tobacco Cessation Plan:   Phone counseling: Place order for Quit Partner Referral      BMI:   Estimated body mass index is 28.12 kg/m  as calculated from the following:    Height as of this encounter: 1.778 m (5' 10\").    Weight as of this encounter: 88.9 kg (196 lb).   Weight management plan: Discussed healthy diet and exercise guidelines      No follow-ups on file.    Estelle Reyna, CNP  [facility] - [place]IT[place] E[place] is a 42 year old presenting for the following health issues:  Asthma      HPI   Depression and Anxiety Follow-Up  Declines intervention. Aware of options for therapy and/or medication treatment.     How are you doing with your depression since your last visit? No change    How are you doing with your anxiety since your last visit?  No change    Are you having other symptoms that might be associated with depression or " anxiety? No    Have you had a significant life event? OTHER: getting divorce     Do you have any concerns with your use of alcohol or other drugs? No    Social History     Tobacco Use     Smoking status: Current Every Day Smoker     Packs/day: 0.50     Years: 26.00     Pack years: 13.00     Types: Cigarettes, Other     Smokeless tobacco: Never Used   Substance Use Topics     Alcohol use: Yes     Alcohol/week: 6.0 standard drinks     Types: 6 Cans of beer per week     Comment: weekly     Drug use: Never     PHQ 9/30/2021 10/19/2021 9/22/2022   PHQ-9 Total Score 3 10 6   Q9: Thoughts of better off dead/self-harm past 2 weeks Not at all Not at all Not at all     JOHN-7 SCORE 9/30/2021 10/19/2021 9/22/2022   Total Score 3 1 6     Last PHQ-9 9/22/2022   1.  Little interest or pleasure in doing things 1   2.  Feeling down, depressed, or hopeless 0   3.  Trouble falling or staying asleep, or sleeping too much 2   4.  Feeling tired or having little energy 2   5.  Poor appetite or overeating 0   6.  Feeling bad about yourself 0   7.  Trouble concentrating 1   8.  Moving slowly or restless 0   Q9: Thoughts of better off dead/self-harm past 2 weeks 0   PHQ-9 Total Score 6   Difficulty at work, home, or with people Somewhat difficult     JOHN-7  9/22/2022   1. Feeling nervous, anxious, or on edge 1   2. Not being able to stop or control worrying 1   3. Worrying too much about different things 1   4. Trouble relaxing 2   5. Being so restless that it is hard to sit still 0   6. Becoming easily annoyed or irritable 1   7. Feeling afraid, as if something awful might happen 0   JOHN-7 Total Score 6   If you checked any problems, how difficult have they made it for you to do your work, take care of things at home, or get along with other people? Somewhat difficult       Asthma Follow-Up    Was ACT completed today?    Yes    ACT Total Scores 9/22/2022   ACT TOTAL SCORE (Goal Greater than or Equal to 20) 16   In the past 12 months, how  "many times did you visit the emergency room for your asthma without being admitted to the hospital? 0   In the past 12 months, how many times were you hospitalized overnight because of your asthma? 0     Overall, [facility] reports that he his asthma control is adequate. Currently, we area going through a lot of temperature swings and he is going through a divorce. These are increasing symptoms but he denies concerns.       How many days per week do you miss taking your asthma controller medication?  2    Please describe any recent triggers for your asthma: cold air and humid    Have you had any Emergency Room Visits, Urgent Care Visits, or Hospital Admissions since your last office visit?  No      Review of Systems   Constitutional, HEENT, cardiovascular, pulmonary, gi and gu systems are negative, except as otherwise noted.      Objective    /82 (BP Location: Right arm, Patient Position: Sitting, Cuff Size: Adult Regular)   Pulse 88   Temp 97.8  F (36.6  C) (Tympanic)   Resp 16   Ht 1.778 m (5' 10\")   Wt 88.9 kg (196 lb)   SpO2 97%   BMI 28.12 kg/m    Body mass index is 28.12 kg/m .     Physical Exam   GENERAL: healthy, alert and no distress  EYES: Eyes grossly normal to inspection, PERRL and conjunctivae and sclerae normal  NECK: no adenopathy, no asymmetry, masses, or scars and thyroid normal to palpation  RESP: lungs clear to auscultation - no rales, rhonchi or wheezes  CV: regular rate and rhythm, normal S1 S2, no S3 or S4, no murmur, click or rub, no peripheral edema and peripheral pulses strong  NEURO: Normal strength and tone, mentation intact and speech normal  PSYCH: mentation appears normal, affect normal/bright                "

## 2022-09-22 ENCOUNTER — OFFICE VISIT (OUTPATIENT)
Dept: FAMILY MEDICINE | Facility: OTHER | Age: 42
End: 2022-09-22
Attending: NURSE PRACTITIONER
Payer: COMMERCIAL

## 2022-09-22 VITALS
SYSTOLIC BLOOD PRESSURE: 128 MMHG | HEIGHT: 70 IN | HEART RATE: 88 BPM | DIASTOLIC BLOOD PRESSURE: 82 MMHG | WEIGHT: 196 LBS | RESPIRATION RATE: 16 BRPM | BODY MASS INDEX: 28.06 KG/M2 | OXYGEN SATURATION: 97 % | TEMPERATURE: 97.8 F

## 2022-09-22 DIAGNOSIS — Z11.4 SCREENING FOR HIV (HUMAN IMMUNODEFICIENCY VIRUS): ICD-10-CM

## 2022-09-22 DIAGNOSIS — Z13.220 LIPID SCREENING: ICD-10-CM

## 2022-09-22 DIAGNOSIS — F17.200 NICOTINE DEPENDENCE, UNCOMPLICATED, UNSPECIFIED NICOTINE PRODUCT TYPE: ICD-10-CM

## 2022-09-22 DIAGNOSIS — F10.20 ALCOHOLISM (H): ICD-10-CM

## 2022-09-22 DIAGNOSIS — Z11.59 ENCOUNTER FOR HEPATITIS C SCREENING TEST FOR LOW RISK PATIENT: ICD-10-CM

## 2022-09-22 DIAGNOSIS — R73.01 IMPAIRED FASTING GLUCOSE: ICD-10-CM

## 2022-09-22 DIAGNOSIS — J45.40 MODERATE PERSISTENT ASTHMA WITHOUT COMPLICATION: Primary | ICD-10-CM

## 2022-09-22 PROCEDURE — 99214 OFFICE O/P EST MOD 30 MIN: CPT | Performed by: NURSE PRACTITIONER

## 2022-09-22 ASSESSMENT — ANXIETY QUESTIONNAIRES
5. BEING SO RESTLESS THAT IT IS HARD TO SIT STILL: NOT AT ALL
GAD7 TOTAL SCORE: 6
1. FEELING NERVOUS, ANXIOUS, OR ON EDGE: SEVERAL DAYS
2. NOT BEING ABLE TO STOP OR CONTROL WORRYING: SEVERAL DAYS
7. FEELING AFRAID AS IF SOMETHING AWFUL MIGHT HAPPEN: NOT AT ALL
6. BECOMING EASILY ANNOYED OR IRRITABLE: SEVERAL DAYS
3. WORRYING TOO MUCH ABOUT DIFFERENT THINGS: SEVERAL DAYS
IF YOU CHECKED OFF ANY PROBLEMS ON THIS QUESTIONNAIRE, HOW DIFFICULT HAVE THESE PROBLEMS MADE IT FOR YOU TO DO YOUR WORK, TAKE CARE OF THINGS AT HOME, OR GET ALONG WITH OTHER PEOPLE: SOMEWHAT DIFFICULT
GAD7 TOTAL SCORE: 6

## 2022-09-22 ASSESSMENT — PAIN SCALES - GENERAL: PAINLEVEL: NO PAIN (0)

## 2022-09-22 ASSESSMENT — ASTHMA QUESTIONNAIRES: ACT_TOTALSCORE: 16

## 2022-09-22 ASSESSMENT — PATIENT HEALTH QUESTIONNAIRE - PHQ9
SUM OF ALL RESPONSES TO PHQ QUESTIONS 1-9: 6
5. POOR APPETITE OR OVEREATING: MORE THAN HALF THE DAYS

## 2022-09-22 NOTE — PATIENT INSTRUCTIONS
How to Quit Smoking  Smoking is a hard habit to break. About 50% of all people who have ever smoked have been able to quit. Most people who still smoke want to quit. Here are some of the best ways to stop smoking.     Keep in mind the health benefits of quitting  The health benefits of quitting start right away. They keep improving the longer you go without smoking. Knowing this can help inspire you to stay on track. These benefits occur at any age. If you are 17 or 70, quitting is a good choice. Some of the health benefits after your last cigarette include:     After 20 minutes: Your blood pressure and pulse return to normal.    After 8 hours: Your oxygen levels return to normal.    After 2 days: Your ability to smell and taste start to improve as damaged nerves regrow.    After 2 to 3 weeks: Your circulation and lung function improve.    After 1 to 9 months: Your coughing, congestion, and shortness of breath decrease. Your tiredness decreases.    After 1 year: Your risk of heart attack decreases by 50%.    After 5 years: Your risk of lung cancer decreases by 50%. Your risk of stroke becomes the same as a nonsmoker s.  Go cold turkey  Most former smokers quit cold turkey. This means stopping all at once. Trying to cut back slowly often doesn't work as well. This may be because it continues the habit of smoking. Also you may inhale more smoke while smoking fewer cigarettes. This leads to the same amount of nicotine in your body.   Get support  Support programs can be a big help, especially for heavy smokers. These groups offer lectures, ways to change behavior, and peer support. Here are some ways to find a support program:     Free national quitline 800-QUIT-NOW (140-360-3073)    [facility] quit-smoking programs    American Lung Association 280-649-8909    American Cancer Society 199-091-2485  Support at home is important too. Family and friends can offer praise and reassurance. If the smoker in your life finds  it hard to quit, encourage them to keep trying.   Try over-the-counter medicine  Nicotine replacement therapy may make it easier to quit. Some aids are available without a prescription. These include a nicotine patch, gum, and lozenges. But it's best to use these under the care of your healthcare provider. The skin patch gives a steady supply of nicotine. Nicotine gum and lozenges give short-time doses of low levels of nicotine. Both methods reduce the craving for cigarettes. If you have nausea, vomiting, dizziness, weakness, or a fast heartbeat, stop using these products. See your provider.   Ask about prescription medicine  After reviewing your smoking patterns and past attempts to quit, your healthcare provider may offer a prescription medicine such as bupropion, varenicline, a nicotine inhaler, or nasal spray. Each has advantages and side effects. Your provider can review these with you.   Keep trying  Most smokers make many attempts at quitting before they succeed. It s important not to give up.   To learn more  For more on how to quit smoking, try these resources:     www.cdc.gov/tobacco/quit_smoking/ 800-QUIT-NOW (049-994-6100)    www.smokefree.gov 877-44U-QUIT (486-719-8671)    www.lung.org/stop-smoking/ 800-LUNGUSA (555-171-8788)  Kristi last reviewed this educational content on 12/1/2019 2000-2021 [employer]. All rights reserved. This information is not intended as a substitute for professional medical care. Always follow your healthcare professional's instructions.

## 2022-09-22 NOTE — NURSING NOTE
"Chief Complaint   Patient presents with     Asthma       Initial /82 (BP Location: Right arm, Patient Position: Sitting, Cuff Size: Adult Regular)   Pulse 88   Temp 97.8  F (36.6  C) (Tympanic)   Resp 16   Ht 1.778 m (5' 10\")   Wt 88.9 kg (196 lb)   SpO2 97%   BMI 28.12 kg/m   Estimated body mass index is 28.12 kg/m  as calculated from the following:    Height as of this encounter: 1.778 m (5' 10\").    Weight as of this encounter: 88.9 kg (196 lb).  Medication Reconciliation: complete  Whit Mtz LPN    "

## 2022-09-28 ENCOUNTER — LAB (OUTPATIENT)
Dept: LAB | Facility: OTHER | Age: 42
End: 2022-09-28
Payer: COMMERCIAL

## 2022-09-28 DIAGNOSIS — R73.01 IMPAIRED FASTING GLUCOSE: ICD-10-CM

## 2022-09-28 DIAGNOSIS — Z11.4 SCREENING FOR HIV (HUMAN IMMUNODEFICIENCY VIRUS): ICD-10-CM

## 2022-09-28 DIAGNOSIS — F10.20 ALCOHOLISM (H): ICD-10-CM

## 2022-09-28 DIAGNOSIS — Z11.59 ENCOUNTER FOR HEPATITIS C SCREENING TEST FOR LOW RISK PATIENT: ICD-10-CM

## 2022-09-28 DIAGNOSIS — Z13.220 LIPID SCREENING: ICD-10-CM

## 2022-09-28 LAB
ALBUMIN SERPL-MCNC: 4.2 G/DL (ref 3.4–5)
ALP SERPL-CCNC: 58 U/L (ref 40–150)
ALT SERPL W P-5'-P-CCNC: 46 U/L (ref 0–70)
ANION GAP SERPL CALCULATED.3IONS-SCNC: 7 MMOL/L (ref 3–14)
AST SERPL W P-5'-P-CCNC: 21 U/L (ref 0–45)
BILIRUB SERPL-MCNC: 0.8 MG/DL (ref 0.2–1.3)
BUN SERPL-MCNC: 16 MG/DL (ref 7–30)
CALCIUM SERPL-MCNC: 9.3 MG/DL (ref 8.5–10.1)
CHLORIDE BLD-SCNC: 106 MMOL/L (ref 94–109)
CHOLEST SERPL-MCNC: 175 MG/DL
CO2 SERPL-SCNC: 23 MMOL/L (ref 20–32)
CREAT SERPL-MCNC: 1.25 MG/DL (ref 0.66–1.25)
EST. AVERAGE GLUCOSE BLD GHB EST-MCNC: 126 MG/DL
FASTING STATUS PATIENT QL REPORTED: YES
GFR SERPL CREATININE-BSD FRML MDRD: 73 ML/MIN/1.73M2
GLUCOSE BLD-MCNC: 136 MG/DL (ref 70–99)
HBA1C MFR BLD: 6 % (ref 0–5.6)
HDLC SERPL-MCNC: 49 MG/DL
LDLC SERPL CALC-MCNC: 87 MG/DL
NONHDLC SERPL-MCNC: 126 MG/DL
POTASSIUM BLD-SCNC: 3.6 MMOL/L (ref 3.4–5.3)
PROT SERPL-MCNC: 7.5 G/DL (ref 6.8–8.8)
SODIUM SERPL-SCNC: 136 MMOL/L (ref 133–144)
TRIGL SERPL-MCNC: 193 MG/DL

## 2022-09-28 PROCEDURE — 80061 LIPID PANEL: CPT

## 2022-09-28 PROCEDURE — 87389 HIV-1 AG W/HIV-1&-2 AB AG IA: CPT

## 2022-09-28 PROCEDURE — 86803 HEPATITIS C AB TEST: CPT

## 2022-09-28 PROCEDURE — 83036 HEMOGLOBIN GLYCOSYLATED A1C: CPT

## 2022-09-28 PROCEDURE — 36415 COLL VENOUS BLD VENIPUNCTURE: CPT

## 2022-09-28 PROCEDURE — 80053 COMPREHEN METABOLIC PANEL: CPT

## 2022-09-29 LAB
HCV AB SERPL QL IA: NONREACTIVE
HIV 1+2 AB+HIV1 P24 AG SERPL QL IA: NONREACTIVE

## 2022-09-29 NOTE — RESULT ENCOUNTER NOTE
He remains in the pre-diabetic range. Lifestyle charges: maintain healthy BMI, daily aerobic exercise, healthful- low glycemic diet- avoid packaged foods.

## 2022-10-07 ENCOUNTER — OFFICE VISIT (OUTPATIENT)
Dept: FAMILY MEDICINE | Facility: OTHER | Age: 42
End: 2022-10-07
Attending: NURSE PRACTITIONER
Payer: COMMERCIAL

## 2022-10-07 ENCOUNTER — TELEPHONE (OUTPATIENT)
Dept: FAMILY MEDICINE | Facility: OTHER | Age: 42
End: 2022-10-07

## 2022-10-07 ENCOUNTER — NURSE TRIAGE (OUTPATIENT)
Dept: FAMILY MEDICINE | Facility: OTHER | Age: 42
End: 2022-10-07

## 2022-10-07 VITALS
TEMPERATURE: 98.4 F | BODY MASS INDEX: 28.06 KG/M2 | HEART RATE: 88 BPM | SYSTOLIC BLOOD PRESSURE: 146 MMHG | RESPIRATION RATE: 24 BRPM | OXYGEN SATURATION: 98 % | HEIGHT: 70 IN | DIASTOLIC BLOOD PRESSURE: 84 MMHG | WEIGHT: 196 LBS

## 2022-10-07 DIAGNOSIS — K08.89 PAIN, DENTAL: Primary | ICD-10-CM

## 2022-10-07 PROCEDURE — 99212 OFFICE O/P EST SF 10 MIN: CPT | Performed by: NURSE PRACTITIONER

## 2022-10-07 ASSESSMENT — PAIN SCALES - GENERAL: PAINLEVEL: EXTREME PAIN (8)

## 2022-10-07 ASSESSMENT — ASTHMA QUESTIONNAIRES: ACT_TOTALSCORE: 21

## 2022-10-07 NOTE — TELEPHONE ENCOUNTER
10:55 AM    Reason for Call: Phone Call    Description: call back    Was an appointment offered for this call? No  If yes : Appointment type              Date    Preferred method for responding to this message: Telephone Call  What is your phone number ? [phone]    If we cannot reach you directly, may we leave a detailed response at the number you provided? Yes    Can this message wait until your PCP/provider returns, if available today?, Not applicable    THEO LEACH

## 2022-10-07 NOTE — NURSING NOTE
"Chief Complaint   Patient presents with     Derm Problem       Initial BP (!) 142/94 (BP Location: Left arm, Patient Position: Sitting, Cuff Size: Adult Regular)   Pulse 88   Temp 98.4  F (36.9  C) (Tympanic)   Resp 24   Ht 1.778 m (5' 10\")   Wt 88.9 kg (196 lb)   SpO2 98%   BMI 28.12 kg/m   Estimated body mass index is 28.12 kg/m  as calculated from the following:    Height as of this encounter: 1.778 m (5' 10\").    Weight as of this encounter: 88.9 kg (196 lb).  Medication Reconciliation: complete  Whit Mtz LPN    "

## 2022-10-07 NOTE — PROGRESS NOTES
"  Assessment & Plan     Pain, dental  [facility] reports he was prescribed an antibiotic by his dentist. I recommend he start this medication. Follow up with dentist.            No follow-ups on file.    Estelle Reyna, CNP  [facility] - [place] Ely is a 42 year old presenting for the following health issues:  Derm Problem      HPI     Concern - top right tooth pain  Onset: 1 week- sudden onset pain to top right inner gumline. Has not been able to tolerate eating very well. Dentist did send in penicillin for him.   Description: top right tooth pain  Intensity: severe  Progression of Symptoms:  worsening  Accompanying Signs & Symptoms: nasal drainage upon rising  Previous history of similar problem: no  Precipitating factors:        Worsened by: eating  Alleviating factors:        Improved by: heat  Therapies tried and outcome: swishing warm water    Asthma Follow-Up    Was ACT completed today?    Yes    ACT Total Scores 10/7/2022   ACT TOTAL SCORE (Goal Greater than or Equal to 20) 21   In the past 12 months, how many times did you visit the emergency room for your asthma without being admitted to the hospital? 0   In the past 12 months, how many times were you hospitalized overnight because of your asthma? 0          How many days per week do you miss taking your asthma controller medication?  2    Please describe any recent triggers for your asthma: None    Have you had any Emergency Room Visits, Urgent Care Visits, or Hospital Admissions since your last office visit?  No      Review of Systems   Constitutional, HEENT, cardiovascular, pulmonary, gi and gu systems are negative, except as otherwise noted.      Objective    BP (!) 146/84   Pulse 88   Temp 98.4  F (36.9  C) (Tympanic)   Resp 24   Ht 1.778 m (5' 10\")   Wt 88.9 kg (196 lb)   SpO2 98%   BMI 28.12 kg/m    Body mass index is 28.12 kg/m .     Physical Exam   GENERAL: healthy, alert and no distress  EYES: Eyes grossly normal " to inspection, PERRL and conjunctivae and sclerae normal  HENT: normal cephalic/atraumatic, ear canals and TM's normal, oropharynx clear, oral mucous membranes moist and dentition: slight edema to right upper and posterior  gumline medially   NECK: no adenopathy, no asymmetry, masses, or scars and thyroid normal to palpation  SKIN: no suspicious lesions or rashes    No results found for any visits on 10/07/22.

## 2022-10-07 NOTE — TELEPHONE ENCOUNTER
"Lance't today 10/7  2:30pm  Provider Axel    Reason for Disposition    SEVERE pain (e.g., excruciating)    Additional Information    Negative: Widespread rash and bright red, sunburn-like and too weak to stand    Negative: Sounds like a life-threatening emergency to the triager    Negative: Painful lump or swelling at opening to anus (rectum)    Negative: Painful lump or swelling at opening to vagina (on labia)    Negative: Painful lump or swelling on scrotum    Negative: Doesn't match the SYMPTOMS of a boil    Negative: Widespread red rash    Negative: Black (necrotic) color or blisters develop in wound    Negative: Patient sounds very sick or weak to the triager    Answer Assessment - Initial Assessment Questions  1. APPEARANCE of BOIL: \"What does the boil look like?\"       Cannot view  2. LOCATION: \"Where is the boil located?\"       R molar gumline  3. NUMBER: \"How many boils are there?\"       one  4. SIZE: \"How big is the boil?\" (e.g., inches, cm; compare to size of a coin or other object)      Long & skinny  5. ONSET: \"When did the boil start?\"      10/3  6. PAIN: \"Is there any pain?\" If Yes, ask: \"How bad is the pain?\"   (Scale 1-10; or mild, moderate, severe)      Yes, almost cannot eat  7. FEVER: \"Do you have a fever?\" If Yes, ask: \"What is it, how was it measured, and when did it start?\"       no  8. SOURCE: \"Have you been around anyone with boils or other Staph infections?\" \"Have you ever had boils before?\"      no  9. OTHER SYMPTOMS: \"Do you have any other symptoms?\" (e.g., shaking chills, weakness, rash elsewhere on body)      none  10. PREGNANCY: \"Is there any chance you are pregnant?\" \"When was your last menstrual period?\"        no    Protocols used: BOIL (SKIN ABSCESS)-A-OH      "

## 2022-10-10 ENCOUNTER — MYC MEDICAL ADVICE (OUTPATIENT)
Dept: FAMILY MEDICINE | Facility: OTHER | Age: 42
End: 2022-10-10

## 2022-10-10 DIAGNOSIS — J45.40 MODERATE PERSISTENT ASTHMA WITHOUT COMPLICATION: Primary | ICD-10-CM

## 2022-10-12 RX ORDER — FLUTICASONE PROPIONATE AND SALMETEROL XINAFOATE 115; 21 UG/1; UG/1
2 AEROSOL, METERED RESPIRATORY (INHALATION) 2 TIMES DAILY
Qty: 8 G | Refills: 0 | Status: SHIPPED | OUTPATIENT
Start: 2022-10-12 | End: 2023-03-23

## 2022-12-05 DIAGNOSIS — K21.9 GASTROESOPHAGEAL REFLUX DISEASE, UNSPECIFIED WHETHER ESOPHAGITIS PRESENT: ICD-10-CM

## 2022-12-06 RX ORDER — OMEPRAZOLE 40 MG/1
CAPSULE, DELAYED RELEASE ORAL
Qty: 90 CAPSULE | Refills: 3 | Status: SHIPPED | OUTPATIENT
Start: 2022-12-06 | End: 2023-06-21

## 2023-01-01 NOTE — TELEPHONE ENCOUNTER
ACT if seen within past year. Schedule follow up if score below 20. hard copy, drawn during this pregnancy

## 2023-01-07 ENCOUNTER — HEALTH MAINTENANCE LETTER (OUTPATIENT)
Age: 43
End: 2023-01-07

## 2023-01-16 DIAGNOSIS — J45.40 MODERATE PERSISTENT ASTHMA WITHOUT COMPLICATION: ICD-10-CM

## 2023-01-16 RX ORDER — FLUTICASONE PROPIONATE AND SALMETEROL 250; 50 UG/1; UG/1
POWDER RESPIRATORY (INHALATION)
Qty: 1 EACH | Refills: 3 | Status: SHIPPED | OUTPATIENT
Start: 2023-01-16 | End: 2023-01-17

## 2023-01-17 DIAGNOSIS — J45.40 MODERATE PERSISTENT ASTHMA WITHOUT COMPLICATION: ICD-10-CM

## 2023-01-17 RX ORDER — FLUTICASONE PROPIONATE AND SALMETEROL 250; 50 UG/1; UG/1
POWDER RESPIRATORY (INHALATION)
Qty: 1 EACH | Refills: 3 | Status: SHIPPED | OUTPATIENT
Start: 2023-01-17 | End: 2023-03-23

## 2023-01-20 DIAGNOSIS — G47.00 INSOMNIA, UNSPECIFIED TYPE: ICD-10-CM

## 2023-01-20 NOTE — TELEPHONE ENCOUNTER
Clonazepam      Last Written Prescription Date:  5/26/22  Last Fill Quantity: 60,   # refills: 5  Last Office Visit: 10/7/22  Future Office visit:    Next 5 appointments (look out 90 days)    Mar 23, 2023  1:00 PM  (Arrive by 12:45 PM)  SHORT with Estelle Reyna CNP  [facility] ([facility] ) [address]  [facility] [address]  [phone]           Routing refill request to provider for review/approval because:

## 2023-01-23 RX ORDER — CLONAZEPAM 0.5 MG/1
TABLET ORAL
Qty: 60 TABLET | Refills: 2 | Status: SHIPPED | OUTPATIENT
Start: 2023-01-23 | End: 2024-02-02

## 2023-02-01 ENCOUNTER — TELEPHONE (OUTPATIENT)
Dept: PULMONOLOGY | Facility: OTHER | Age: 43
End: 2023-02-01

## 2023-02-03 ENCOUNTER — VIRTUAL VISIT (OUTPATIENT)
Dept: PULMONOLOGY | Facility: OTHER | Age: 43
End: 2023-02-03
Attending: FAMILY MEDICINE
Payer: COMMERCIAL

## 2023-02-03 VITALS — HEIGHT: 70 IN | WEIGHT: 190 LBS | BODY MASS INDEX: 27.2 KG/M2

## 2023-02-03 DIAGNOSIS — G47.33 OSA (OBSTRUCTIVE SLEEP APNEA): ICD-10-CM

## 2023-02-03 DIAGNOSIS — G47.63 SLEEP-RELATED BRUXISM: Primary | ICD-10-CM

## 2023-02-03 DIAGNOSIS — R09.81 NASAL CONGESTION: ICD-10-CM

## 2023-02-03 DIAGNOSIS — J32.9 CHRONIC SINUSITIS, UNSPECIFIED LOCATION: ICD-10-CM

## 2023-02-03 PROCEDURE — 99214 OFFICE O/P EST MOD 30 MIN: CPT | Mod: 95 | Performed by: FAMILY MEDICINE

## 2023-02-03 ASSESSMENT — SLEEP AND FATIGUE QUESTIONNAIRES
HOW LIKELY ARE YOU TO NOD OFF OR FALL ASLEEP WHILE SITTING AND TALKING TO SOMEONE: SLIGHT CHANCE OF DOZING
HOW LIKELY ARE YOU TO NOD OFF OR FALL ASLEEP WHEN YOU ARE A PASSENGER IN A CAR FOR AN HOUR WITHOUT A BREAK: WOULD NEVER DOZE
HOW LIKELY ARE YOU TO NOD OFF OR FALL ASLEEP WHILE SITTING QUIETLY AFTER LUNCH WITHOUT ALCOHOL: MODERATE CHANCE OF DOZING
HOW LIKELY ARE YOU TO NOD OFF OR FALL ASLEEP WHILE WATCHING TV: MODERATE CHANCE OF DOZING
HOW LIKELY ARE YOU TO NOD OFF OR FALL ASLEEP WHILE SITTING INACTIVE IN A PUBLIC PLACE: WOULD NEVER DOZE
HOW LIKELY ARE YOU TO NOD OFF OR FALL ASLEEP WHILE SITTING AND READING: MODERATE CHANCE OF DOZING
HOW LIKELY ARE YOU TO NOD OFF OR FALL ASLEEP IN A CAR, WHILE STOPPED FOR A FEW MINUTES IN TRAFFIC: WOULD NEVER DOZE
HOW LIKELY ARE YOU TO NOD OFF OR FALL ASLEEP WHILE LYING DOWN TO REST IN THE AFTERNOON WHEN CIRCUMSTANCES PERMIT: HIGH CHANCE OF DOZING

## 2023-02-03 ASSESSMENT — PAIN SCALES - GENERAL: PAINLEVEL: NO PAIN (0)

## 2023-02-03 NOTE — PROGRESS NOTES
Video-Visit Details    Type of service:  Video Visit    Video Start Time (time video started): 1:04pm    Video End Time (time video stopped): 1:30pm    Originating Location (pt. Location): Home        Distant Location (provider location):  Off-site    Mode of Communication:  Video Conference via [employer]            Virtual visit for history of mild obstructive sleep apnea, severe nocturnal bruxism/clenching.     Assessment / Plan:    1.)  History of mild obstructive sleep apnea  -We discussed that we may reconsider treatment for obstructive sleep apnea given the significant nocturnal clenching and [facility].  - Clinically, he also wonders if the apnea has worsened due to increase in snoring and his clenching.  - We agreed to proceed with repeat home sleep test, orders placed today.  We will see if the dental clinic would recommend a full in lab PSG with masseter EMG muscle monitoring, though I am not sure if it would change treatment options but will await their input.    2.)  Chronic sleep onset and maintenance insomnia  - Appears stable after consultation and follow-up with Dr. Rojas for CBT-I and clonazepam 0.5-1mg PO at bedtime.  -For now, we agreed to continue the clonazepam 0.5-1 mg at bedtime as needed, may be having some mild benefit in regards to clenching.  We discussed that this something that may be changed if there is recommendation for medication for his clenching that would interact with the clonazepam.    3.)  Chronic sinusitis and nasal congestion with poor nasal airflow  - He reports previous history of what sounds like bilateral nasal turbinate reduction  - He would like to reevaluate, so I did place a referral to Dr. Estrada of ENT today.       SUBJECTIVE:  Solis Prescott is a 42 year old male.    Pertinent PMHx: EDWIGE, severe nocturnal clenching / bruxism    Prior Sleep Testin2021 - HST with weight 203 lbs, BMI 29.1.  AHI 6.  AHI was  "11.9 per hour supine, - per hour prone, 0.9 per hour on left side, and 0.6 per hour on right side.  Percent of time spent: supine - 46.7%, prone - 0%, on left - 29.5%, on right - 23.3%.  Average oxygen saturation was 92.5%.  Time with saturation less than or equal to 88% was 15.3 minutes, though was present in a singular period of unexplained sustained hypoxemia that I suspect represents artifact. The lowest oxygen saturation was 83%.    Found one visit with [facility] sleep clinic on 1/16/2013.  PSG with AHI 3, ramo SpO2 89%, alpha intrusion.  Follow-up visit on 6/26/2014 for chronic insomnia, managed with clonazepam 1mg and appearing stable.  I did not have more recent visits for review from another sleep clinic.     11/9/2021 -we reviewed his home sleep test study in detail, but is also considering transferring care for his chronic insomnia.  He reports a history of insomnia for 10+ years has included difficulty falling asleep and staying asleep.  He previously followed with the [facility] sleep clinic and [place].  Historically, he has been tried on a few different medications including:  Trazodone  Clonazepam-General well-tolerated reportedly taken for approximately 8 years, discontinued for unclear reasons when returning to [place] in May.  Zolpidem-mild benefit, significant morning grogginess, concern given multiple associates with abnormal nocturnal behaviors  Amitriptyline-mild benefit     STOP-BANG score of 3, with unknown neck circumference.  [place] score of 8.  BMI of Estimated body mass index is 29.13 kg/m  as calculated from the following:    Height as of 10/19/21: 1.778 m (5' 10\").    Weight as of 10/19/21: 92.1 kg (203 lb).      Per questionnaire: \"It was recommended by my sleep provider as I still don't sleep well.\"     Caffeine use:  Yes for 3+ per day.  No for within 6 hours of bed.     Tobacco use: Yes     Sleep pattern:  Workdays.  11:30pm - 6:30am, total sleep " time 6-7 hours.  Weekends.  2am - 10am, total sleep time 8 hours.  Time to fall asleep: ~30 minutes.  Awakenings: 2-3 times per night, 15 minutes to return to sleep.  Napping.  0 days per week, - hours per nap.     No for RLS screen.  No for sleep walking.  No for dream enactment behavior.  Yes for bruxism.     No for morning headaches.  Yes for snoring.  No for observed apnea.  No for FHx of EDWIGE.     SHx:  , lives with wife and 2 daughters.  Works in IT support.    A/P for restart of clonazepam 0.5-1mg PO at bedtime, referral to sleep psychology with Dr. Rojas, no specific treatment for mild EDWIGE at this time.    Today -he presents today to touch base before his upcoming consultation with the [facility] dental clinic in regards to his severe nocturnal clenching.  He feels that his clenching has worsened over the past 2 years, and became more of a urgent concern around September or October 2022 when he actually cracked a tooth presumed from clenching.  He also has morning jaw soreness, jaw pain.    Last visit with Dr. Rojas on 6/24/2022, overall improvement and plan for follow-up as needed.    He also notes some significant social stressors which is going through divorce, challenges finding a new home for purchase in the current housing market.  He also had increase in chronic sinus issues, poor nasal airflow.  He reports that this led to him having a bilateral nasal turbinate reduction a number of years ago, and he is wondering if this is something that should be reevaluated.  He also reports poor nasal airflow consistently.        Insomnia Severity Index    Difficulty falling asleep 1    Difficulty staying asleep 2    Problems waking up too early 2    How SATISFIED/DISSATISFIED are you with your CURRENT sleep pattern? 4    How NOTICEABLE to others do you think your sleep problem is in terms of impairing the quality of your life? 1    How WORRIED/DISTRESSED are you about your current sleep  "problem? 3    To what extent do you consider your sleep problem to INTERFERE with your daily functioning (e.g. daytime fatigue, mood, ability to function at work/daily chores, concentration, memory, mood, etc.) CURRENTLY? 2    Total Score 15        Past medical history:    Patient Active Problem List    Diagnosis Date Noted     Alcoholism (H) 07/08/2020     Priority: Medium     Hypertriglyceridemia 07/08/2020     Priority: Medium     Tobacco abuse 07/08/2020     Priority: Medium     Allergic rhinitis 11/23/2012     Priority: Medium     Asthma 11/23/2012     Priority: Medium     Impaired fasting glucose 11/23/2012     Priority: Medium     Insomnia 11/23/2012     Priority: Medium       10 point ROS of systems including Constitutional, Eyes, Respiratory, Cardiovascular, Gastroenterology, Genitourinary, Integumentary, Muscularskeletal, Psychiatric were all negative except for pertinent positives noted in my HPI.    Current Outpatient Medications   Medication Sig Dispense Refill     albuterol (PROAIR HFA/PROVENTIL HFA/VENTOLIN HFA) 108 (90 Base) MCG/ACT inhaler Inhale 2 puffs into the lungs every 4 hours as needed for shortness of breath / dyspnea or wheezing 18 g 3     clonazePAM (KLONOPIN) 0.5 MG tablet Take 1-2 tablets by mouth 15-30 minutes before bed. 60 tablet 2     fluticasone-salmeterol (ADVAIR DISKUS) 250-50 MCG/ACT inhaler USE 1 INHALATION EVERY 12 HOURS Strength: 250-50 MCG/ACT 1 each 3     fluticasone-salmeterol (ADVAIR HFA) 115-21 MCG/ACT inhaler Inhale 2 puffs into the lungs 2 times daily 8 g 0     montelukast (SINGULAIR) 10 MG tablet TAKE 1 TABLET AT BEDTIME 90 tablet 3     omeprazole (PRILOSEC) 40 MG DR capsule TAKE 1 CAPSULE DAILY 90 capsule 3     triamcinolone (KENALOG) 0.1 % external cream          OBJECTIVE:  Ht 1.778 m (5' 10\")   Wt 86.2 kg (190 lb)   BMI 27.26 kg/m      Physical Exam     ---  This note was written with the assistance of the Dragon voice-dictation technology software. The final " document, although reviewed, may contain errors. For corrections, please contact the office.    Total time spent preparing to see the patient, review of chart, obtaining history and physical examination, review of sleep testing, review of treatment options, education, discussion with patient and documenting in Epic / EMR was 30 minutes.  All time involved was spent on the day of service for the patient (the same day as the patient's appointment).    Kofi Richey MD    Sleep Medicine    [place], [place]  o Main Office: [phone]    [facility] [place], [place]  o [address], [address]  o Schedule visits: [phone]  o Main Office: [phone]  o Fax: [phone]

## 2023-02-03 NOTE — PROGRESS NOTES
"Solis is a 42 year old who is being evaluated via a billable video visit.      How would you like to obtain your AVS? MyChart  If the video visit is dropped, the invitation should be resent by: Send to e-mail at: [email or web address]  Will anyone else be joining your video visit? No  {If patient encounters technical issues they should call [phone] :[id number]}  Josep Lynn      Video-Visit Details    Type of service:  Video Visit     Originating Location (pt. Location): {video visit patient location:228013::\"Home\"}  {PROVIDER LOCATION On-site should be selected for visits conducted from your clinic location or adjoining [facility] hospital, academic office, or other nearby [facility] building. Off-site should be selected for all other provider locations, including home:[id number]}  Distant Location (provider location):  {virtual location provider:923700}  Platform used for Video Visit: {Virtual Visit Platforms:255175::\"[employer]\"}  "

## 2023-02-16 NOTE — PROGRESS NOTES
Otolaryngology Consultation    Patient: Solis Prescott  : 1980    Patient presents with:  Consult: Chronic Sinusitis, Nasal Congestion; Referred by Dr. Kofi Richey      HPI:  Solis Prescott is a 42 year old male seen today for chronic sinusitis and nasal obstruction.  Hx of mild intermittent asthma, chronic eustachian tube dysfunction      He has a history of mild obstructive sleep apnea as well as chronic sleep onset and maintenance insomnia with severe nocturnal clenching/bruxism.  Appt with [facility]    History of asthma, 13-pack-year history of tobacco use with current everyday smoking, and alcoholism    Hx dust mite allergy      History a previous bilateral turbinate reduction in [place] about 2 years ago.  He noted improvement for over 1 year.  He now notes worsening bilateral chronic congestion, more pronounced on left.      SNOT today's date 51 with a moderate problem with need to blow nose nasal blockage rhinorrhea cough and postnasal discharge    He has chronic daytime somnolence and multiple sleep concerns    He has a longstanding history of recurrent acute otitis media as well as TMJ with bruxism.  No otorrhea or complaints of fluctuating hearing loss or bothersome tinnitus.  [facility] and [facility] of [facility].  [facility]    ETDQ 30, mean 4.28 with a moderate problem with pressure in his ear is a feeling that he is underwater a moderate problem with ear problems during sinusitis, a severe problem with crackling or popping in the ears a moderate problem with tinnitus and muffled hearing    He was referred to me by Dr. Richey in sleep medicine, 2/3 note reviewed        He has a repeat HST  Results reviewed from initial home sleep test on 2021 he had an apnea-hypopnea index of 11.9 supine with desaturation to 83%    Current therapy includes Singulair.  flonase helps marginally  Breathe right strips help marginally    He uses  "Advair and as needed albuterol      DDS at [facility]        Current Outpatient Rx   Medication Sig Dispense Refill     albuterol (PROAIR HFA/PROVENTIL HFA/VENTOLIN HFA) 108 (90 Base) MCG/ACT inhaler Inhale 2 puffs into the lungs every 4 hours as needed for shortness of breath / dyspnea or wheezing 18 g 3     clonazePAM (KLONOPIN) 0.5 MG tablet Take 1-2 tablets by mouth 15-30 minutes before bed. 60 tablet 2     fluticasone-salmeterol (ADVAIR DISKUS) 250-50 MCG/ACT inhaler USE 1 INHALATION EVERY 12 HOURS Strength: 250-50 MCG/ACT 1 each 3     fluticasone-salmeterol (ADVAIR HFA) 115-21 MCG/ACT inhaler Inhale 2 puffs into the lungs 2 times daily 8 g 0     montelukast (SINGULAIR) 10 MG tablet TAKE 1 TABLET AT BEDTIME 90 tablet 3     omeprazole (PRILOSEC) 40 MG DR capsule TAKE 1 CAPSULE DAILY 90 capsule 3     triamcinolone (KENALOG) 0.1 % external cream          Allergies: Patient has no known allergies.     History reviewed. No pertinent past medical history.    Past Surgical History:   Procedure Laterality Date     ESOPHAGOSCOPY, GASTROSCOPY, DUODENOSCOPY (EGD), COMBINED N/A 12/16/2021    Procedure: Upper endoscopy with biopsies;  Surgeon: Maximilian Lynn MD;  Location: [facility] OR     SINUS SURGERY       VASECTOMY         ENT family history reviewed    Social History     Tobacco Use     Smoking status: Every Day     Packs/day: 0.50     Years: 26.00     Pack years: 13.00     Types: Cigarettes, Other     Smokeless tobacco: Never   Substance Use Topics     Alcohol use: Yes     Alcohol/week: 6.0 standard drinks     Types: 6 Cans of beer per week     Comment: weekly     Drug use: Never       Review of Systems  ROS: 10 point ROS neg other than the symptoms noted above in the HPI and abdominal pain heartburn diarrhea, shortness of breath on exertion, wheezing cough, neck pain, jaw pain    Physical Exam  /76 (Cuff Size: Adult Large)   Pulse 77   Temp 97.6  F (36.4  C) (Tympanic)   Ht 1.778 m (5' 10\")   " Wt 86.2 kg (190 lb)   SpO2 98%   BMI 27.26 kg/m    General - The patient is well nourished and well developed, and appears to have good nutritional status.  Alert and oriented to person and place, answers questions and cooperates with examination appropriately.   Head and Face - Normocephalic and atraumatic, with no gross asymmetry noted.  The facial nerve is intact, with strong symmetric movements.  Voice and Breathing - The patient was breathing comfortably without the use of accessory muscles. There was no wheezing, stridor, or stertor.  The patients voice was clear and strong, and had appropriate pitch and quality.  No vy peripheral digital clubbing or cyanosis   Ears -The external auditory canals are patent, the tympanic membranes are intact without effusion or mass.  No concerning attic retraction or evidence of cholesteatoma.  bony landmarks are intact.  Left tympanic membrane with moderate pars tensa retraction  Eyes - Extraocular movements intact, and the pupils were reactive to light.  Sclera were not icteric or injected, conjunctiva were pink and moist.  Mouth - Examination of the oral cavity showed pink, healthy oral mucosa. No lesions or ulcerations noted.  The tongue was mobile and midline, and the dentition were in good condition.    Throat - The walls of the oropharynx were smooth, pink, moist, symmetric, and had no lesions or ulcerations.  The tonsillar pillars and soft palate were symmetric.  The uvula was midline on elevation.  Diego Palate Position IIa, grade 2 tonsils, elongated uvula  Neck - No palpable enlarged fixed cervical lymph nodes.  No neck cysts or unusual tenderness to palpation.   No palpable fixed thyroid nodules or concerning goiter.  The trachea is grossly midline.   Nose - External contour is slightly asymmetric at ala, no gross deflection or scars.  Nasal mucosa is pink and moist with no abnormal mucus.  The septum and turbinates were evaluated.  No polyps, masses, or  purulence noted on examination.  Right worse than left external nasal valve collapse bilateral internal nasal valve collapse.  Both improved with modified Stutsman maneuver    To evaluate the nose and sinuses, I performed rigid nasal endoscopy.  I applied topical nasal lidocaine and neosynephrine.    I began with the LEFT side using a 0 degree rigid nasal endoscope, and then similarly examined the RIGHT side    Findings:  Inferior turbinates:  Lateralized, post partial excision  Middle turbinate and middle meatus:  No purulence, no polyposis, bilateral enlarged middle turbinate, probable pk  Former maxillary antrostomies and ethmoid cavity clear and healthy  Septum is irregular  Superior meatus was evaluated  Frontal recess clear  Mucosa is edematous throughout,  No vy polyps nor polypoid degeneration  Sphenoethmoidal recess without purulence   Nasopharynx clear, et edematous bilaterally  The patient tolerated the procedure well        Impression and Plan- Solis ALEX Prescott is a 42 year old male with:    ICD-10-CM    1. Nasal valve collapse  J34.89 budesonide (PULMICORT) 0.5 MG/2ML neb solution     CT Sinus w/o Contrast      2. History of endoscopic sinus surgery  Z98.890 budesonide (PULMICORT) 0.5 MG/2ML neb solution     CT Sinus w/o Contrast      3. Dust allergy  J30.89       4. Mild intermittent asthma without complication  J45.20       5. Tobacco abuse counseling  Z71.6       6. EDWIGE (obstructive sleep apnea)  G47.33       7. ETD (Eustachian tube dysfunction), bilateral  H69.83       8. Tympanic membrane retraction, left  H73.892         Follow-up with TMD clinic [facility] as scheduled    Start budesonide irrigations    CT sinus    Complete repeat HST, awaiting sleep lab to notify him    [facility]  Hearing preservation reinforced    Minimally proceed with bilateral nasal valve repair, bilateral partial excision middle turbinates, ablation posterior nasal nerves, eustachian tube  dilation    Addendum:  .    CT sinus dated 2/22/2023 shows aplastic frontal sinuses the ethmoid sinuses are clear the sphenoid sinuses are clear the antrostomies are widely patent with mild mucoperiosteal thickening of the maxillary sinuses the septum is grossly midline turbinates with mild edema.  There is an obstructive Antonia cell on the right and mucoperiosteal thickening adjacent to the remaining portion of a Antonia cell on the left middle turbinates are edematous    Recommend continued budesonide irrigations for at least 3 months and then as needed  Follow-up with me if nasal and sinus symptoms persist      Tobacco cessation was strongly encouraged.  The associated risk of head and neck cancer was discussed.  Every year of cessation offers health benefits. This was discussed with the patient today and they voiced understanding.  Quit tools and a nicotine patch were offered.    Risks of untreated sleep apnea are well documented, including but not limited to, the inability to reach restorative sleep leading to daytime somnolence, fatigue, irritability and poor work performance, risk of motor vehicle accidents, depression, memory issues, waking headaches, impotence, and increased nocturia.  More serious risks include concerns with medication/narcotic use and surgery related to the use of anesthesia, coronary artery disease, heart failure, heart attack, stroke and sudden death.    Achieving a healthy weight, adhering to a healthy diet, and exercise are very important in the treatment of sleep apnea and were discussed and encouraged.    Clinical evidence shows CPAP to be the treatment of choice for obstructive sleep apnea. However, if CPAP is not tolerated after reasonable attempts at treatment, surgical intervention may be necessary.   A sleep study will be arranged if this has not already been done.        Anne Estrada D.O.  Otolaryngology/Head and Neck Surgery  Allergy

## 2023-02-17 ENCOUNTER — OFFICE VISIT (OUTPATIENT)
Dept: OTOLARYNGOLOGY | Facility: OTHER | Age: 43
End: 2023-02-17
Attending: FAMILY MEDICINE
Payer: COMMERCIAL

## 2023-02-17 VITALS
SYSTOLIC BLOOD PRESSURE: 134 MMHG | WEIGHT: 190 LBS | OXYGEN SATURATION: 98 % | HEART RATE: 77 BPM | TEMPERATURE: 97.6 F | BODY MASS INDEX: 27.2 KG/M2 | DIASTOLIC BLOOD PRESSURE: 76 MMHG | HEIGHT: 70 IN

## 2023-02-17 DIAGNOSIS — J30.89 DUST ALLERGY: ICD-10-CM

## 2023-02-17 DIAGNOSIS — H69.93 ETD (EUSTACHIAN TUBE DYSFUNCTION), BILATERAL: ICD-10-CM

## 2023-02-17 DIAGNOSIS — G47.33 OSA (OBSTRUCTIVE SLEEP APNEA): ICD-10-CM

## 2023-02-17 DIAGNOSIS — J45.20 MILD INTERMITTENT ASTHMA WITHOUT COMPLICATION: ICD-10-CM

## 2023-02-17 DIAGNOSIS — H73.892 TYMPANIC MEMBRANE RETRACTION, LEFT: ICD-10-CM

## 2023-02-17 DIAGNOSIS — Z98.890 HISTORY OF ENDOSCOPIC SINUS SURGERY: ICD-10-CM

## 2023-02-17 DIAGNOSIS — J34.829 NASAL VALVE COLLAPSE: Primary | ICD-10-CM

## 2023-02-17 DIAGNOSIS — Z71.6 TOBACCO ABUSE COUNSELING: ICD-10-CM

## 2023-02-17 PROCEDURE — 99204 OFFICE O/P NEW MOD 45 MIN: CPT | Mod: 25 | Performed by: OTOLARYNGOLOGY

## 2023-02-17 PROCEDURE — 31231 NASAL ENDOSCOPY DX: CPT | Performed by: OTOLARYNGOLOGY

## 2023-02-17 RX ORDER — BUDESONIDE 0.5 MG/2ML
INHALANT ORAL
Qty: 200 ML | Refills: 11 | Status: SHIPPED | OUTPATIENT
Start: 2023-02-17 | End: 2023-04-14

## 2023-02-17 ASSESSMENT — PAIN SCALES - GENERAL: PAINLEVEL: NO PAIN (0)

## 2023-02-17 NOTE — PATIENT INSTRUCTIONS
Thank you for allowing Dr. Estrada and our ENT team to participate in your care.  If your medications are too expensive, please give the nurse a call.  We can possibly change this medication.  If you have a scheduling or an appointment question please contact our Health Unit Coordinator at their direct line [phone].   ALL nursing questions or concerns can be directed to your ENT nurse at: [phone] - [place]    Complete Sinus CT Scan    Use Budesonide Rinses Twice Daily    We will plan a follow up according to the CT results      Budesonide instructions  Make the Adolfo Med Saline Solution using 2 packages of salt and previously boiled or distilled water.  This will make 240 ml of saline solution.  Mix the entire vial of budesonide into the solution.   Irrigate your nose 2 times a day with the warm budesonide solution using 1 bottle between nostrils in the morning, and one bottle at night.

## 2023-02-17 NOTE — LETTER
2023         RE: Solis Prescott  [address]  [facility] [address]        Dear Colleague,    Thank you for referring your patient, Solis Prescott, to the [facility]. Please see a copy of my visit note below.    Otolaryngology Consultation    Patient: Solis Prescott  : 1980    Patient presents with:  Consult: Chronic Sinusitis, Nasal Congestion; Referred by Dr. Kofi Richey      HPI:  Solis Prescott is a 42 year old male seen today for chronic sinusitis and nasal obstruction.  Hx of mild intermittent asthma, chronic eustachian tube dysfunction      He has a history of mild obstructive sleep apnea as well as chronic sleep onset and maintenance insomnia with severe nocturnal clenching/bruxism.  Appt with [facility]    History of asthma, 13-pack-year history of tobacco use with current everyday smoking, and alcoholism    Hx dust mite allergy      History a previous bilateral turbinate reduction in [place] about 2 years ago.  He noted improvement for over 1 year.  He now notes worsening bilateral chronic congestion, more pronounced on left.      SNOT today's date 51 with a moderate problem with need to blow nose nasal blockage rhinorrhea cough and postnasal discharge    He has chronic daytime somnolence and multiple sleep concerns    He has a longstanding history of recurrent acute otitis media as well as TMJ with bruxism.  No otorrhea or complaints of fluctuating hearing loss or bothersome tinnitus.  [facility] and stated history of [facility] loss.  [facility]    ETDQ 30, mean 4.28 with a moderate problem with pressure in his ear is a feeling that he is underwater a moderate problem with ear problems during sinusitis, a severe problem with crackling or popping in the ears a moderate problem with tinnitus and muffled hearing    He was referred to me by Dr. Richey in sleep medicine, 2/3 note reviewed        He has a repeat  HST  Results reviewed from initial home sleep test on 11/2/2021 he had an apnea-hypopnea index of 11.9 supine with desaturation to 83%    Current therapy includes Singulair.  flonase helps marginally  Breathe right strips help marginally    He uses Advair and as needed albuterol      DDS at [facility]        Current Outpatient Rx   Medication Sig Dispense Refill     albuterol (PROAIR HFA/PROVENTIL HFA/VENTOLIN HFA) 108 (90 Base) MCG/ACT inhaler Inhale 2 puffs into the lungs every 4 hours as needed for shortness of breath / dyspnea or wheezing 18 g 3     clonazePAM (KLONOPIN) 0.5 MG tablet Take 1-2 tablets by mouth 15-30 minutes before bed. 60 tablet 2     fluticasone-salmeterol (ADVAIR DISKUS) 250-50 MCG/ACT inhaler USE 1 INHALATION EVERY 12 HOURS Strength: 250-50 MCG/ACT 1 each 3     fluticasone-salmeterol (ADVAIR HFA) 115-21 MCG/ACT inhaler Inhale 2 puffs into the lungs 2 times daily 8 g 0     montelukast (SINGULAIR) 10 MG tablet TAKE 1 TABLET AT BEDTIME 90 tablet 3     omeprazole (PRILOSEC) 40 MG DR capsule TAKE 1 CAPSULE DAILY 90 capsule 3     triamcinolone (KENALOG) 0.1 % external cream          Allergies: Patient has no known allergies.     History reviewed. No pertinent past medical history.    Past Surgical History:   Procedure Laterality Date     ESOPHAGOSCOPY, GASTROSCOPY, DUODENOSCOPY (EGD), COMBINED N/A 12/16/2021    Procedure: Upper endoscopy with biopsies;  Surgeon: Maximilian Lynn MD;  Location: [facility]     SINUS SURGERY       VASECTOMY         ENT family history reviewed    Social History     Tobacco Use     Smoking status: Every Day     Packs/day: 0.50     Years: 26.00     Pack years: 13.00     Types: Cigarettes, Other     Smokeless tobacco: Never   Substance Use Topics     Alcohol use: Yes     Alcohol/week: 6.0 standard drinks     Types: 6 Cans of beer per week     Comment: weekly     Drug use: Never       Review of Systems  ROS: 10 point ROS neg other than the symptoms noted above  "in the HPI and abdominal pain heartburn diarrhea, shortness of breath on exertion, wheezing cough, neck pain, jaw pain    Physical Exam  /76 (Cuff Size: Adult Large)   Pulse 77   Temp 97.6  F (36.4  C) (Tympanic)   Ht 1.778 m (5' 10\")   Wt 86.2 kg (190 lb)   SpO2 98%   BMI 27.26 kg/m    General - The patient is well nourished and well developed, and appears to have good nutritional status.  Alert and oriented to person and place, answers questions and cooperates with examination appropriately.   Head and Face - Normocephalic and atraumatic, with no gross asymmetry noted.  The facial nerve is intact, with strong symmetric movements.  Voice and Breathing - The patient was breathing comfortably without the use of accessory muscles. There was no wheezing, stridor, or stertor.  The patients voice was clear and strong, and had appropriate pitch and quality.  No vy peripheral digital clubbing or cyanosis   Ears -The external auditory canals are patent, the tympanic membranes are intact without effusion or mass.  No concerning attic retraction or evidence of cholesteatoma.  bony landmarks are intact.  Left tympanic membrane with moderate pars tensa retraction  Eyes - Extraocular movements intact, and the pupils were reactive to light.  Sclera were not icteric or injected, conjunctiva were pink and moist.  Mouth - Examination of the oral cavity showed pink, healthy oral mucosa. No lesions or ulcerations noted.  The tongue was mobile and midline, and the dentition were in good condition.    Throat - The walls of the oropharynx were smooth, pink, moist, symmetric, and had no lesions or ulcerations.  The tonsillar pillars and soft palate were symmetric.  The uvula was midline on elevation.  Diego Palate Position IIa, grade 2 tonsils, elongated uvula  Neck - No palpable enlarged fixed cervical lymph nodes.  No neck cysts or unusual tenderness to palpation.   No palpable fixed thyroid nodules or concerning " goiter.  The trachea is grossly midline.   Nose - External contour is slightly asymmetric at ala, no gross deflection or scars.  Nasal mucosa is pink and moist with no abnormal mucus.  The septum and turbinates were evaluated.  No polyps, masses, or purulence noted on examination.  Right worse than left external nasal valve collapse bilateral internal nasal valve collapse.  Both improved with modified Pitt maneuver    To evaluate the nose and sinuses, I performed rigid nasal endoscopy.  I applied topical nasal lidocaine and neosynephrine.    I began with the LEFT side using a 0 degree rigid nasal endoscope, and then similarly examined the RIGHT side    Findings:  Inferior turbinates:  Lateralized, post partial excision  Middle turbinate and middle meatus:  No purulence, no polyposis, bilateral enlarged middle turbinate, probable pk  Former maxillary antrostomies and ethmoid cavity clear and healthy  Septum is irregular  Superior meatus was evaluated  Frontal recess clear  Mucosa is edematous throughout,  No vy polyps nor polypoid degeneration  Sphenoethmoidal recess without purulence   Nasopharynx clear, et edematous bilaterally  The patient tolerated the procedure well        Impression and Plan- Solis ALEX Prescott is a 42 year old male with:    ICD-10-CM    1. Nasal valve collapse  J34.89 budesonide (PULMICORT) 0.5 MG/2ML neb solution     CT Sinus w/o Contrast      2. History of endoscopic sinus surgery  Z98.890 budesonide (PULMICORT) 0.5 MG/2ML neb solution     CT Sinus w/o Contrast      3. Dust allergy  J30.89       4. Mild intermittent asthma without complication  J45.20       5. Tobacco abuse counseling  Z71.6       6. EDWIGE (obstructive sleep apnea)  G47.33       7. ETD (Eustachian tube dysfunction), bilateral  H69.83       8. Tympanic membrane retraction, left  H73.892         Follow-up with [facility] clinic [facility] as scheduled    Start budesonide irrigations    CT sinus    Complete repeat HST, awaiting sleep  lab to notify him    [facility].  Hearing preservation reinforced    Minimally proceed with bilateral nasal valve repair, bilateral partial excision middle turbinates, ablation posterior nasal nerves, eustachian tube dilation        Tobacco cessation was strongly encouraged.  The associated risk of head and neck cancer was discussed.  Every year of cessation offers health benefits. This was discussed with the patient today and they voiced understanding.  Quit tools and a nicotine patch were offered.    Risks of untreated sleep apnea are well documented, including but not limited to, the inability to reach restorative sleep leading to daytime somnolence, fatigue, irritability and poor work performance, risk of motor vehicle accidents, depression, memory issues, waking headaches, impotence, and increased nocturia.  More serious risks include concerns with medication/narcotic use and surgery related to the use of anesthesia, coronary artery disease, heart failure, heart attack, stroke and sudden death.    Achieving a healthy weight, adhering to a healthy diet, and exercise are very important in the treatment of sleep apnea and were discussed and encouraged.    Clinical evidence shows CPAP to be the treatment of choice for obstructive sleep apnea. However, if CPAP is not tolerated after reasonable attempts at treatment, surgical intervention may be necessary.   A sleep study will be arranged if this has not already been done.        Anne Estrada D.O.  Otolaryngology/Head and Neck Surgery  Allergy                    Again, thank you for allowing me to participate in the care of your patient.        Sincerely,        Anne Estrada MD

## 2023-02-22 ENCOUNTER — HOSPITAL ENCOUNTER (OUTPATIENT)
Dept: CT IMAGING | Facility: HOSPITAL | Age: 43
Discharge: HOME | End: 2023-02-22
Attending: OTOLARYNGOLOGY | Admitting: OTOLARYNGOLOGY
Payer: COMMERCIAL

## 2023-02-22 DIAGNOSIS — Z98.890 HISTORY OF ENDOSCOPIC SINUS SURGERY: ICD-10-CM

## 2023-02-22 DIAGNOSIS — J34.829 NASAL VALVE COLLAPSE: ICD-10-CM

## 2023-02-22 PROCEDURE — 70486 CT MAXILLOFACIAL W/O DYE: CPT

## 2023-03-08 ENCOUNTER — VIRTUAL VISIT (OUTPATIENT)
Dept: SLEEP MEDICINE | Facility: HOSPITAL | Age: 43
End: 2023-03-08
Attending: FAMILY MEDICINE
Payer: COMMERCIAL

## 2023-03-08 DIAGNOSIS — G47.33 OSA (OBSTRUCTIVE SLEEP APNEA): Primary | ICD-10-CM

## 2023-03-08 NOTE — PROGRESS NOTES
Patient was provided both verbal and written education and instructions on use of Watch PAT device. Watch PAT device has been registered and shipped via [employer] on 3/8/2023. Patient was notified that package was mailed out. Watch [employer] serial number: [id number].    Tracking Number# [id number]

## 2023-03-09 DIAGNOSIS — J45.40 MODERATE PERSISTENT ASTHMA WITHOUT COMPLICATION: ICD-10-CM

## 2023-03-09 RX ORDER — MONTELUKAST SODIUM 10 MG/1
1 TABLET ORAL AT BEDTIME
Qty: 90 TABLET | Refills: 3 | Status: SHIPPED | OUTPATIENT
Start: 2023-03-09 | End: 2023-06-21

## 2023-03-09 NOTE — TELEPHONE ENCOUNTER
montelukast (SINGULAIR) 10 MG tablet      Last Written Prescription Date:  2/10/22  Last Fill Quantity: 90,   # refills: 3  Last Office Visit: 10/07/22  Future Office visit:    Next 5 appointments (look out 90 days)    Mar 23, 2023  1:00 PM  (Arrive by 12:45 PM)  SHORT with Estelle Reyna CNP  [facility] ([facility] ) [address]  [facility] [address]  [phone]           Routing refill request to provider for review/approval because:

## 2023-03-21 ENCOUNTER — DOCUMENTATION ONLY (OUTPATIENT)
Dept: SLEEP MEDICINE | Facility: HOSPITAL | Age: 43
End: 2023-03-21
Attending: FAMILY MEDICINE
Payer: COMMERCIAL

## 2023-03-21 DIAGNOSIS — G47.33 OSA (OBSTRUCTIVE SLEEP APNEA): ICD-10-CM

## 2023-03-21 DIAGNOSIS — G47.63 SLEEP-RELATED BRUXISM: ICD-10-CM

## 2023-03-21 PROCEDURE — 95800 SLP STDY UNATTENDED: CPT

## 2023-03-21 PROCEDURE — 95800 SLP STDY UNATTENDED: CPT | Mod: 26 | Performed by: FAMILY MEDICINE

## 2023-03-21 NOTE — PROGRESS NOTES
WatchPAT data has been received and has been scored using rule 1B, 4%. Patient to follow up with provider to determine appropriate therapy.    Pat AHI: 0.8    Ordering Provider: Dr Kofi Richey      Sleep Technician/Technologist: Lupis NOLEN

## 2023-03-21 NOTE — PROGRESS NOTES
"  Assessment & Plan     Moderate persistent asthma without complication  Currently ACT not at goal. However, this is likely related to recent viral illness. Feeling much better and almost back to baseline. Will plan on rechecking ACT in 1-2 mo via mychart and follow up with Preventative Visit in Sept. Advair reordered.   - fluticasone-salmeterol (ADVAIR DISKUS) 250-50 MCG/ACT inhaler; USE 1 INHALATION EVERY 12 HOURS Strength: 250-50 MCG/ACT    Nicotine dependence, uncomplicated, unspecified nicotine product type  Declines formal intervention. Handouts provided.   - SMOKING CESSATION COUNSELING 3-10 MIN    Alcohol use  Advised on reducing intake further. Still at 18 drinks per week (3 days @ 6/day).     Insomnia, unspecified type  Managed through Sleep Med    Prediabetes  Stable at last check. Actively working on lifestyle changes as noted.       Prescription drug management  No LOS data to display   Time spent doing chart review, history and exam, documentation and further activities per the note     BMI:   Estimated body mass index is 28.12 kg/m  as calculated from the following:    Height as of this encounter: 1.778 m (5' 10\").    Weight as of this encounter: 88.9 kg (196 lb).   Weight management plan: Discussed healthy diet and exercise guidelines      No follow-ups on file.    Estelle Reyna, [facility]    Yamila   Solis is a 42 year old presenting for the following health issues:  Asthma and Hyperlipidemia      HPI   Asthma Follow-Up  Was ACT completed today?  Score : 15. Has been sick with a cold in the past 2 weeks. When not ill, Solis reports his asthma has been controlled.   Yes    ACT Total Scores 10/7/2022   ACT TOTAL SCORE (Goal Greater than or Equal to 20) 21   In the past 12 months, how many times did you visit the emergency room for your asthma without being admitted to the hospital? 0   In the past 12 months, how many times were you hospitalized overnight because of " "your asthma? 0          How many days per week do you miss taking your asthma controller medication?  2    Please describe any recent triggers for your asthma: dust mites    Have you had any Emergency Room Visits, Urgent Care Visits, or Hospital Admissions since your last office visit?  No    Hyperlipidemia Follow-Up  No medication at this time. Has lost weight over past few years and continues to make improvement in diet and lifestyle changes. Highest Wt was 220 lbs (pt reported) and currently at 196 lbs. Moving today and plans to start biking to work this spring. Not fasting today. Last Lipid panel 5 months ago was normal except for trigs of 193.     Are you regularly taking any medication or supplement to lower your cholesterol?   No    Are you having muscle aches or other side effects that you think could be caused by your cholesterol lowering medication?  No      Smoking Cessation: Discussed. Declines formal interventions (quit plan, med, or replacement). Working to quit on his own.      Alcohol intake: 3 days a week 6 pack at a time.  Reports hx of fatty liver. Has cut back significantly. Discussed risks    Insomnia-  Working with Sleep medicine on this.       PHQ 9/30/2021 10/19/2021 9/22/2022   PHQ-9 Total Score 3 10 6   Q9: Thoughts of better off dead/self-harm past 2 weeks Not at all Not at all Not at all     JOHN-7 SCORE 9/30/2021 10/19/2021 9/22/2022   Total Score 3 1 6     Solis declines any formal interventions for mental health. Making many positive life changes including moving today. Reports level of stress is decreased over past.     Review of Systems   Constitutional, HEENT, cardiovascular, pulmonary, gi and gu systems are negative, except as otherwise noted.      Objective    /74 (BP Location: Right arm, Patient Position: Sitting, Cuff Size: Adult Regular)   Pulse 80   Temp 97.8  F (36.6  C) (Tympanic)   Resp 12   Ht 1.778 m (5' 10\")   Wt 88.9 kg (196 lb)   SpO2 97%   BMI 28.12 kg/m  "   Body mass index is 28.12 kg/m .     Physical Exam   GENERAL: healthy, alert and no distress  EYES: Eyes grossly normal to inspection, PERRL and conjunctivae and sclerae normal  RESP: lungs clear to auscultation - no rales, rhonchi or wheezes  CV: regular rate and rhythm, normal S1 S2, no S3 or S4, no murmur, click or rub, no peripheral edema and peripheral pulses strong  NEURO: Normal strength and tone, mentation intact and speech normal  PSYCH: mentation appears normal, affect normal/bright

## 2023-03-23 ENCOUNTER — OFFICE VISIT (OUTPATIENT)
Dept: FAMILY MEDICINE | Facility: OTHER | Age: 43
End: 2023-03-23
Attending: NURSE PRACTITIONER
Payer: COMMERCIAL

## 2023-03-23 VITALS
BODY MASS INDEX: 28.06 KG/M2 | HEIGHT: 70 IN | HEART RATE: 80 BPM | RESPIRATION RATE: 12 BRPM | WEIGHT: 196 LBS | TEMPERATURE: 97.8 F | DIASTOLIC BLOOD PRESSURE: 74 MMHG | SYSTOLIC BLOOD PRESSURE: 124 MMHG | OXYGEN SATURATION: 97 %

## 2023-03-23 DIAGNOSIS — Z78.9 ALCOHOL USE: ICD-10-CM

## 2023-03-23 DIAGNOSIS — F17.200 NICOTINE DEPENDENCE, UNCOMPLICATED, UNSPECIFIED NICOTINE PRODUCT TYPE: ICD-10-CM

## 2023-03-23 DIAGNOSIS — G47.00 INSOMNIA, UNSPECIFIED TYPE: ICD-10-CM

## 2023-03-23 DIAGNOSIS — R73.03 PREDIABETES: ICD-10-CM

## 2023-03-23 DIAGNOSIS — J45.40 MODERATE PERSISTENT ASTHMA WITHOUT COMPLICATION: Primary | ICD-10-CM

## 2023-03-23 PROCEDURE — 99214 OFFICE O/P EST MOD 30 MIN: CPT | Performed by: NURSE PRACTITIONER

## 2023-03-23 RX ORDER — FLUTICASONE PROPIONATE AND SALMETEROL 250; 50 UG/1; UG/1
POWDER RESPIRATORY (INHALATION)
Qty: 3 EACH | Refills: 3 | Status: SHIPPED | OUTPATIENT
Start: 2023-03-23 | End: 2023-04-26

## 2023-03-23 ASSESSMENT — ANXIETY QUESTIONNAIRES
1. FEELING NERVOUS, ANXIOUS, OR ON EDGE: SEVERAL DAYS
3. WORRYING TOO MUCH ABOUT DIFFERENT THINGS: SEVERAL DAYS
2. NOT BEING ABLE TO STOP OR CONTROL WORRYING: SEVERAL DAYS
7. FEELING AFRAID AS IF SOMETHING AWFUL MIGHT HAPPEN: SEVERAL DAYS
6. BECOMING EASILY ANNOYED OR IRRITABLE: SEVERAL DAYS
5. BEING SO RESTLESS THAT IT IS HARD TO SIT STILL: NOT AT ALL
IF YOU CHECKED OFF ANY PROBLEMS ON THIS QUESTIONNAIRE, HOW DIFFICULT HAVE THESE PROBLEMS MADE IT FOR YOU TO DO YOUR WORK, TAKE CARE OF THINGS AT HOME, OR GET ALONG WITH OTHER PEOPLE: SOMEWHAT DIFFICULT
GAD7 TOTAL SCORE: 6
GAD7 TOTAL SCORE: 6

## 2023-03-23 ASSESSMENT — PATIENT HEALTH QUESTIONNAIRE - PHQ9
SUM OF ALL RESPONSES TO PHQ QUESTIONS 1-9: 6
5. POOR APPETITE OR OVEREATING: SEVERAL DAYS

## 2023-03-23 ASSESSMENT — PAIN SCALES - GENERAL: PAINLEVEL: NO PAIN (0)

## 2023-03-23 ASSESSMENT — ASTHMA QUESTIONNAIRES: ACT_TOTALSCORE: 15

## 2023-03-23 NOTE — PATIENT INSTRUCTIONS
"Repeat Asthma Control test in 1-2 months. If you are having increased symptoms, please contact us so we can figure out a plan. Based on our conversation you are returning back to baseline after your cold.     How to Quit Smoking  Smoking is a hard habit to break. About half of all people who've ever smoked have been able to quit. Most people who still smoke want to quit. Here are some of the best ways to stop smoking.     Keep in mind the health benefits of quitting  The health benefits of quitting start right away. They keep improving the longer you go without smoking. Knowing this can help inspire you to stay on track. These benefits occur at any age. If you're 17 or 70, quitting is a good choice. Some of the health benefits after your last cigarette include:   After 20 minutes:  Your blood pressure and pulse return to normal.  After 8 hours: Your oxygen levels return to normal.  After 2 days: Your ability to smell and taste start to improve as damaged nerves regrow.  After 2 to 3 weeks: Your circulation and lung function improve.  After 1 to 9 months: Your coughing, congestion, and shortness of breath decrease. Your tiredness decreases.  After 1 year: Your risk of heart attack decreases by 50%.  After 5 years: Your risk of lung cancer decreases by 50%. Your risk of stroke becomes the same as a nonsmoker s.  What about going cold turkey?  You may have heard about quitting \"cold turkey.\" This means stopping all at once. Going cold turkey is an option. But it's not the most successful way to quit smoking. Also, trying to cut back slowly often doesn't work as well. This may be because it continues the habit of smoking. You may also inhale more smoke while smoking fewer cigarettes. This leads to the same amount of nicotine in your body.   But quitting cold turkey or cutting back slowly aren't your only choices. Using tobacco cessation medicines with behavioral counseling may be a better option to help you succeed. " Talk with your provider about your options for support while you quit smoking.   Get support  Support programs can be a big help, especially for heavy smokers. These groups offer information, ways to change behavior, and peer support. Ask your provider for some resources. Here are some other ways to find support:   Smokefree.gov at www.smokefree.gov  800-QUIT-NOW (337-966-3838)  American Lung Association at www.lung.org/quit-smoking  442.206.1321  American Cancer Society  at www.cancer.org/quitsmoking  566.563.9594  Support at home is important too. Family and friends can offer praise and reassurance. Ask your friends who smoke to support your decision. Try to stay away from situations that trigger the desire to smoke. This may include smoking with your morning coffee. Or smoking after a meal. Create new routines to help decrease your cravings.   If the smoker in your life finds it hard to quit, encourage them to keep trying. Remind them of all of the benefits to themselves and people they love.   Try over-the-counter nicotine replacements   Nicotine replacement therapy may make it easier to quit. You can buy some aids without a prescription. These include a nicotine patch, gum, and lozenges. But it's best to use these under the care of your healthcare provider. The skin patch gives a steady supply of nicotine. Nicotine gum and lozenges give short-time doses of low levels of nicotine. Both methods reduce the craving for cigarettes. If you have upset stomach (nausea), vomiting, dizziness, weakness, or a fast heartbeat, stop using these products and see your provider.   Ask about prescription medicine  After reviewing your smoking patterns and past attempts to quit, your provider may offer a prescription medicine. These include bupropion, varenicline, a nicotine inhaler, or nasal spray. Each has advantages and side effects. Your provider can go over these with you.   Keep trying  Most smokers try to quit many times  "before they succeed. It s important not to give up.   [employer] last reviewed this educational content on 12/1/2019 2000-2022 [employer]. All rights reserved. This information is not intended as a substitute for professional medical care. Always follow your healthcare professional's instructions.          How to Quit Smoking  Smoking is a hard habit to break. About half of all people who've ever smoked have been able to quit. Most people who still smoke want to quit. Here are some of the best ways to stop smoking.     Keep in mind the health benefits of quitting  The health benefits of quitting start right away. They keep improving the longer you go without smoking. Knowing this can help inspire you to stay on track. These benefits occur at any age. If you're 17 or 70, quitting is a good choice. Some of the health benefits after your last cigarette include:   After 20 minutes:  Your blood pressure and pulse return to normal.  After 8 hours: Your oxygen levels return to normal.  After 2 days: Your ability to smell and taste start to improve as damaged nerves regrow.  After 2 to 3 weeks: Your circulation and lung function improve.  After 1 to 9 months: Your coughing, congestion, and shortness of breath decrease. Your tiredness decreases.  After 1 year: Your risk of heart attack decreases by 50%.  After 5 years: Your risk of lung cancer decreases by 50%. Your risk of stroke becomes the same as a nonsmoker s.  What about going cold turkey?  You may have heard about quitting \"cold turkey.\" This means stopping all at once. Going cold turkey is an option. But it's not the most successful way to quit smoking. Also, trying to cut back slowly often doesn't work as well. This may be because it continues the habit of smoking. You may also inhale more smoke while smoking fewer cigarettes. This leads to the same amount of nicotine in your body.   But quitting cold turkey or cutting back slowly aren't your only " choices. Using tobacco cessation medicines with behavioral counseling may be a better option to help you succeed. Talk with your provider about your options for support while you quit smoking.   Get support  Support programs can be a big help, especially for heavy smokers. These groups offer information, ways to change behavior, and peer support. Ask your provider for some resources. Here are some other ways to find support:   Smokefree.gov at www.smokefree.gov  800-QUIT-NOW (118-611-1595)  American Lung Association at www.lung.org/quit-smoking  658.517.1793  American Cancer Society  at www.cancer.org/quitsmoking  323.490.4210  Support at home is important too. Family and friends can offer praise and reassurance. Ask your friends who smoke to support your decision. Try to stay away from situations that trigger the desire to smoke. This may include smoking with your morning coffee. Or smoking after a meal. Create new routines to help decrease your cravings.   If the smoker in your life finds it hard to quit, encourage them to keep trying. Remind them of all of the benefits to themselves and people they love.   Try over-the-counter nicotine replacements   Nicotine replacement therapy may make it easier to quit. You can buy some aids without a prescription. These include a nicotine patch, gum, and lozenges. But it's best to use these under the care of your healthcare provider. The skin patch gives a steady supply of nicotine. Nicotine gum and lozenges give short-time doses of low levels of nicotine. Both methods reduce the craving for cigarettes. If you have upset stomach (nausea), vomiting, dizziness, weakness, or a fast heartbeat, stop using these products and see your provider.   Ask about prescription medicine  After reviewing your smoking patterns and past attempts to quit, your provider may offer a prescription medicine. These include bupropion, varenicline, a nicotine inhaler, or nasal spray. Each has advantages  and side effects. Your provider can go over these with you.   Keep trying  Most smokers try to quit many times before they succeed. It s important not to give up.   [employer] last reviewed this educational content on 12/1/2019 2000-2022 [employer]. All rights reserved. This information is not intended as a substitute for professional medical care. Always follow your healthcare professional's instructions.

## 2023-03-26 NOTE — PROCEDURES
"WatchPAT - HOME SLEEP STUDY INTERPRETATION    Patient: Solis Prescott  MRN: [id number]  YOB: 1980  Study Date: 3/20/2023  Referring Provider: Estelle Reyna  Ordering Provider: Kofi Richey MD, MD    Chain of custody patient verification was not enabled.       Indications for Home Study: oSlis Prescott is a 42 year old male with a history of EDWIGE, severe nocturnal clenching / bruxism who presents with symptoms suggestive of obstructive sleep apnea.    Prior Sleep Testin2021 - HST with weight 203 lbs, BMI 29.1.  AHI 6.  AHI was 11.9 per hour supine, - per hour prone, 0.9 per hour on left side, and 0.6 per hour on right side.  Percent of time spent: supine - 46.7%, prone - 0%, on left - 29.5%, on right - 23.3%.  Average oxygen saturation was 92.5%.  Time with saturation less than or equal to 88% was 15.3 minutes, though was present in a singular period of unexplained sustained hypoxemia that I suspect represents artifact. The lowest oxygen saturation was 83%.    Estimated body mass index is 28.12 kg/m  as calculated from the following:    Height as of 3/23/23: 1.778 m (5' 10\").    Weight as of 3/23/23: 88.9 kg (196 lb).  Total score - [place]: 10 (2/3/2023 12:48 PM)  STOP-BANG: 3/8    Data: A full night home sleep study was performed recording the standard physiologic parameters including peripheral arterial tonometry (PAT), sound/snoring, body position,  movement, sound, and oxygen saturation by pulse oximetry. Pulse rate was estimated by oximetry recording. Sleep staging (wake, REM, light, and deep sleep) was derived from PAT signal.  This study was considered adequate based on > 4 hours of quality oximetry and respiratory recording. As specified by the AASM Manual for the Scoring of Sleep and Associated events, version 2.3, Rule VIII.D 1B, 4% oxygen desaturation scoring for hypopneas is used as a standard of care on all home sleep apnea testing.    Total Recording Time: 5 hrs, " 34 min  Total Sleep Time: 5 hrs, 9 min  % of Sleep Time REM: 39.2%    Respiratory:  Snoring: Snoring was present.  Respiratory events: The PAT respiratory disturbance index [pRDI] was 7 events per hour.  The PAT apnea/hypopnea index [pAHI] was 0.8 events per hour.  MARIFER was 1.2 events per hour.  During REM sleep the pAHI was 1.7.  Sleep Associated Hypoxemia: sustained hypoxemia was not present. Mean oxygen saturation was 94%.  Minimum was 87%.  Time with saturation less than 88% was 0.2 minutes.    Heart Rate: By pulse oximetry normal rate was noted.     Position: Percent of time spent: supine - 57.7%, prone - 0%, on right - 0%, on left - 42.3%.  pAHI was 1.1 per hour supine, - per hour prone, - per hour on right side, and 0.5 per hour on left side.     Assessment:   No significant obstructive sleep apnea observed.  Sleep associated hypoxemia was not present.    Recommendations:  - Consider in-lab PSG if high pre-test clinical concern for EDWIGE given potential for non-diagnostic home sleep testing.  - Suggest optimizing sleep hygiene and avoiding sleep deprivation.  - Weight management.    Diagnosis Code(s): Snoring R06.83    Kofi Richey MD, MD, March 26, 2023   Diplomate, American Board of Family Medicine, Sleep Medicine

## 2023-04-02 NOTE — PROGRESS NOTES
Solis Prescott is a 42 year old male who is being evaluated via a billable telephone visit.       What phone number would you like to be contacted at?@ [phone]  Home Phone [phone]   Work Phone Not on file.   Mobile [phone]       How would you like to obtain your AVS? [employer]       Telephone Virtual Visit Details     Type of service:  Telephone Virtual Visit     Originating Location (pt. Location): Home     Distant Location (provider location):  Off-site, [facility] [place]      Start Time: 3pm  End Time: 3:15pm    Virtual visit for review of home sleep testing results.     Assessment / Plan:     1.)    No significant sleep disordered breathing on repeat WatchPAT home sleep testing  -We discussed that sleep apnea does not appear to be a strong component of his significant nocturnal bruxism, he will follow-up with the [facility] clinic on further recommendations.     2.)  Chronic sleep onset and maintenance insomnia  - Appears stable after consultation and follow-up with Dr. Rojas for CBT-I and clonazepam 0.5-1mg PO at bedtime.  -For now, we agreed to continue the clonazepam 0.5-1 mg at bedtime as needed, may be having some mild benefit in regards to clenching.  We discussed that this something that may be changed if there is recommendation for medication for his clenching that would interact with the clonazepam.      SUBJECTIVE:  Solis Prescott is a 42 year old male.    Pertinent PMHx: EDWIGE, severe nocturnal clenching / bruxism     Prior Sleep Testin2021 - HST with weight 203 lbs, BMI 29.1.  AHI 6.  AHI was 11.9 per hour supine, - per hour prone, 0.9 per hour on left side, and 0.6 per hour on right side.  Percent of time spent: supine - 46.7%, prone - 0%, on left - 29.5%, on right - 23.3%.  Average oxygen saturation was 92.5%.  Time with saturation less than or equal to 88% was 15.3 minutes, though was present in a singular period of unexplained sustained hypoxemia that  "I suspect represents artifact. The lowest oxygen saturation was 83%.     Found one visit with [facility] sleep clinic on 1/16/2013.  PSG with AHI 3, ramo SpO2 89%, alpha intrusion.  Follow-up visit on 6/26/2014 for chronic insomnia, managed with clonazepam 1mg and appearing stable.  I did not have more recent visits for review from another sleep clinic.     11/9/2021 -we reviewed his home sleep test study in detail, but is also considering transferring care for his chronic insomnia.  He reports a history of insomnia for 10+ years has included difficulty falling asleep and staying asleep.  He previously followed with the [facility] sleep clinic and [place].  Historically, he has been tried on a few different medications including:  Trazodone  Clonazepam-General well-tolerated reportedly taken for approximately 8 years, discontinued for unclear reasons when returning to [place] in May.  Zolpidem-mild benefit, significant morning grogginess, concern given multiple associates with abnormal nocturnal behaviors  Amitriptyline-mild benefit     STOP-BANG score of 3, with unknown neck circumference.  Ewing score of 8.  BMI of Estimated body mass index is 29.13 kg/m  as calculated from the following:    Height as of 10/19/21: 1.778 m (5' 10\").    Weight as of 10/19/21: 92.1 kg (203 lb).      Per questionnaire: \"It was recommended by my sleep provider as I still don't sleep well.\"     Caffeine use:  Yes for 3+ per day.  No for within 6 hours of bed.     Tobacco use: Yes     Sleep pattern:  Workdays.  11:30pm - 6:30am, total sleep time 6-7 hours.  Weekends.  2am - 10am, total sleep time 8 hours.  Time to fall asleep: ~30 minutes.  Awakenings: 2-3 times per night, 15 minutes to return to sleep.  Napping.  0 days per week, - hours per nap.     No for RLS screen.  No for sleep walking.  No for dream enactment behavior.  Yes for bruxism.     No for morning headaches.  Yes for snoring.  No for observed apnea.  No for FHx of " EDWIGE.     SHx:  , lives with wife and 2 daughters.  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     A/P for restart of clonazepam 0.5-1mg PO at bedtime, referral to sleep psychology with Dr. Rojas, no specific treatment for mild EDWIGE at this time.     2/3/2023 -he presents today to [facility] before his upcoming consultation with the [facility] dental clinic in regards to his severe nocturnal clenching.  He feels that his clenching has worsened over the past 2 years, and became more of a urgent concern around September or 2022 when he actually cracked a tooth presumed from clenching.  He also has morning jaw soreness, jaw pain.     Last visit with Dr. Rojas on 2022, overall improvement and plan for follow-up as needed.     He also notes some significant social stressors which is going through divorce, challenges finding a new home for purchase in the current housing market.  He also had increase in chronic sinus issues, poor nasal airflow.  He reports that this led to him having a bilateral nasal turbinate reduction a number of years ago, and he is wondering if this is something that should be reevaluated.  He also reports poor nasal airflow consistently.    A/P to continue clonazepam 0.5-1mg at bedtime, follow-up with Dr. Rojas for CBT-I.    Today -we reviewed his home sleep test results in detail.    WatchPAT - HOME SLEEP STUDY INTERPRETATION     Patient: Solis Prescott  MRN: [id number]  YOB: 1980  Study Date: 3/20/2023  Referring Provider: Estelle Reyna  Ordering Provider: Kofi Richey MD, MD     Chain of custody patient verification was not enabled.       Indications for Home Study: Solis Prescott is a 42 year old male with a history of EDWIGE, severe nocturnal clenching / bruxism who presents with symptoms suggestive of obstructive sleep apnea.     Prior Sleep Testin2021 - HST with weight 203 lbs, BMI 29.1.  AHI 6.  AHI was 11.9 per hour supine, - per hour  "prone, 0.9 per hour on left side, and 0.6 per hour on right side.  Percent of time spent: supine - 46.7%, prone - 0%, on left - 29.5%, on right - 23.3%.  Average oxygen saturation was 92.5%.  Time with saturation less than or equal to 88% was 15.3 minutes, though was present in a singular period of unexplained sustained hypoxemia that I suspect represents artifact. The lowest oxygen saturation was 83%.     Estimated body mass index is 28.12 kg/m  as calculated from the following:    Height as of 3/23/23: 1.778 m (5' 10\").    Weight as of 3/23/23: 88.9 kg (196 lb).  Total score - [place]: 10 (2/3/2023 12:48 PM)  STOP-BANG: 3/8     Data: A full night home sleep study was performed recording the standard physiologic parameters including peripheral arterial tonometry (PAT), sound/snoring, body position,  movement, sound, and oxygen saturation by pulse oximetry. Pulse rate was estimated by oximetry recording. Sleep staging (wake, REM, light, and deep sleep) was derived from PAT signal.  This study was considered adequate based on > 4 hours of quality oximetry and respiratory recording. As specified by the AASM Manual for the Scoring of Sleep and Associated events, version 2.3, Rule VIII.D 1B, 4% oxygen desaturation scoring for hypopneas is used as a standard of care on all home sleep apnea testing.     Total Recording Time: 5 hrs, 34 min  Total Sleep Time: 5 hrs, 9 min  % of Sleep Time REM: 39.2%     Respiratory:  Snoring: Snoring was present.  Respiratory events: The PAT respiratory disturbance index [pRDI] was 7 events per hour.  The PAT apnea/hypopnea index [pAHI] was 0.8 events per hour.  MARIFER was 1.2 events per hour.  During REM sleep the pAHI was 1.7.  Sleep Associated Hypoxemia: sustained hypoxemia was not present. Mean oxygen saturation was 94%.  Minimum was 87%.  Time with saturation less than 88% was 0.2 minutes.     Heart Rate: By pulse oximetry normal rate was noted.      Position: Percent of time spent: " supine - 57.7%, prone - 0%, on right - 0%, on left - 42.3%.  pAHI was 1.1 per hour supine, - per hour prone, - per hour on right side, and 0.5 per hour on left side.      Assessment:   No significant obstructive sleep apnea observed.  Sleep associated hypoxemia was not present.     Recommendations:  - Consider in-lab PSG if high pre-test clinical concern for EDWIGE given potential for non-diagnostic home sleep testing.  - Suggest optimizing sleep hygiene and avoiding sleep deprivation.  - Weight management.     Diagnosis Code(s): Snoring R06.83     Kofi Richey MD, MD, March 26, 2023   Diplomate, American Board of Family Medicine, Sleep Medicine    Past medical history:    Patient Active Problem List    Diagnosis Date Noted     Nicotine dependence, uncomplicated, unspecified nicotine product type 03/23/2023     Priority: Medium     Alcoholism (H) 07/08/2020     Priority: Medium     Hypertriglyceridemia 07/08/2020     Priority: Medium     Tobacco abuse 07/08/2020     Priority: Medium     Allergic rhinitis 11/23/2012     Priority: Medium     Asthma 11/23/2012     Priority: Medium     Prediabetes 11/23/2012     Priority: Medium     Insomnia 11/23/2012     Priority: Medium       10 point ROS of systems including Constitutional, Eyes, Respiratory, Cardiovascular, Gastroenterology, Genitourinary, Integumentary, Muscularskeletal, Psychiatric were all negative except for pertinent positives noted in my HPI.    Current Outpatient Medications   Medication Sig Dispense Refill     albuterol (PROAIR HFA/PROVENTIL HFA/VENTOLIN HFA) 108 (90 Base) MCG/ACT inhaler Inhale 2 puffs into the lungs every 4 hours as needed for shortness of breath / dyspnea or wheezing 18 g 3     budesonide (PULMICORT) 0.5 MG/2ML neb solution Squirt entire vial into isabel med saline solution, mix, and irrigate each nostril until entire bottle empty.  Do this twice daily. 200 mL 11     clonazePAM (KLONOPIN) 0.5 MG tablet Take 1-2 tablets by mouth  15-30 minutes before bed. 60 tablet 2     fluticasone-salmeterol (ADVAIR DISKUS) 250-50 MCG/ACT inhaler USE 1 INHALATION EVERY 12 HOURS Strength: 250-50 MCG/ACT 3 each 3     montelukast (SINGULAIR) 10 MG tablet Take 1 tablet (10 mg) by mouth At Bedtime 90 tablet 3     omeprazole (PRILOSEC) 40 MG DR capsule TAKE 1 CAPSULE DAILY 90 capsule 3     triamcinolone (KENALOG) 0.1 % external cream          OBJECTIVE:  There were no vitals taken for this visit.    Physical Exam:  healthy, alert and no distress  PSYCH: Alert and oriented times 3; coherent speech, normal   rate and volume, able to articulate logical thoughts, able   to abstract reason, no tangential thoughts, no hallucinations   or delusions  His affect is normal  RESP: No cough, no audible wheezing, able to talk in full sentences  Remainder of exam unable to be completed due to telephone visits    ---  This note was written with the assistance of the Dragon voice-dictation technology software. The final document, although reviewed, may contain errors. For corrections, please contact the office.    Total time spent preparing to see the patient, review of chart, obtaining history and physical examination, review of sleep testing, review of treatment options, education, discussion with patient and documenting in Epic / EMR was 20 minutes.  All time involved was spent on the day of service for the patient (the same day as the patient's appointment).    Kofi Richey MD    Sleep Medicine    [place], [place]  o Main Office: [phone]    [facility], [facility] [place], [place]  o [address], [address]  o Schedule visits: [phone]  o Main Office: [phone]  o Fax: [phone]

## 2023-04-03 ENCOUNTER — VIRTUAL VISIT (OUTPATIENT)
Dept: PULMONOLOGY | Facility: OTHER | Age: 43
End: 2023-04-03
Attending: FAMILY MEDICINE
Payer: COMMERCIAL

## 2023-04-03 VITALS — BODY MASS INDEX: 28.06 KG/M2 | WEIGHT: 196 LBS | HEIGHT: 70 IN

## 2023-04-03 DIAGNOSIS — G47.63 SLEEP-RELATED BRUXISM: Primary | ICD-10-CM

## 2023-04-03 DIAGNOSIS — G47.00 INSOMNIA, UNSPECIFIED TYPE: ICD-10-CM

## 2023-04-03 PROCEDURE — 99213 OFFICE O/P EST LOW 20 MIN: CPT | Mod: 95 | Performed by: FAMILY MEDICINE

## 2023-04-03 ASSESSMENT — SLEEP AND FATIGUE QUESTIONNAIRES
HOW LIKELY ARE YOU TO NOD OFF OR FALL ASLEEP WHILE LYING DOWN TO REST IN THE AFTERNOON WHEN CIRCUMSTANCES PERMIT: HIGH CHANCE OF DOZING
HOW LIKELY ARE YOU TO NOD OFF OR FALL ASLEEP WHILE SITTING QUIETLY AFTER LUNCH WITHOUT ALCOHOL: MODERATE CHANCE OF DOZING
HOW LIKELY ARE YOU TO NOD OFF OR FALL ASLEEP WHILE SITTING AND TALKING TO SOMEONE: SLIGHT CHANCE OF DOZING
HOW LIKELY ARE YOU TO NOD OFF OR FALL ASLEEP WHILE WATCHING TV: MODERATE CHANCE OF DOZING
HOW LIKELY ARE YOU TO NOD OFF OR FALL ASLEEP WHILE SITTING AND READING: MODERATE CHANCE OF DOZING
HOW LIKELY ARE YOU TO NOD OFF OR FALL ASLEEP IN A CAR, WHILE STOPPED FOR A FEW MINUTES IN TRAFFIC: WOULD NEVER DOZE
HOW LIKELY ARE YOU TO NOD OFF OR FALL ASLEEP WHILE SITTING INACTIVE IN A PUBLIC PLACE: WOULD NEVER DOZE
HOW LIKELY ARE YOU TO NOD OFF OR FALL ASLEEP WHEN YOU ARE A PASSENGER IN A CAR FOR AN HOUR WITHOUT A BREAK: WOULD NEVER DOZE

## 2023-04-03 ASSESSMENT — PAIN SCALES - GENERAL: PAINLEVEL: NO PAIN (0)

## 2023-04-03 NOTE — NURSING NOTE
Is the patient currently in the state of [place]? YES    Visit mode:TELEPHONE    If the visit is dropped, the patient can be reconnected by: TELEPHONE VISIT: Phone number: [phone]    Will anyone else be joining the visit? NO    How would you like to obtain your AVS? MyChart    Are changes needed to the allergy or medication list? NO    Reason for visit:  Watch Pat follow up results    Denise Lyles, CARRIE/PARISN

## 2023-04-04 DIAGNOSIS — J45.40 MODERATE PERSISTENT ASTHMA WITHOUT COMPLICATION: ICD-10-CM

## 2023-04-06 RX ORDER — FLUTICASONE PROPIONATE AND SALMETEROL 250; 50 UG/1; UG/1
POWDER RESPIRATORY (INHALATION)
Qty: 180 EACH | OUTPATIENT
Start: 2023-04-06

## 2023-04-14 DIAGNOSIS — J34.829 NASAL VALVE COLLAPSE: ICD-10-CM

## 2023-04-14 DIAGNOSIS — Z98.890 HISTORY OF ENDOSCOPIC SINUS SURGERY: ICD-10-CM

## 2023-04-14 RX ORDER — BUDESONIDE 0.5 MG/2ML
INHALANT ORAL
Qty: 200 ML | Refills: 11 | Status: SHIPPED | OUTPATIENT
Start: 2023-04-14 | End: 2023-04-25

## 2023-04-25 DIAGNOSIS — L30.9 ECZEMA, UNSPECIFIED TYPE: ICD-10-CM

## 2023-04-25 DIAGNOSIS — J45.40 MODERATE PERSISTENT ASTHMA WITHOUT COMPLICATION: ICD-10-CM

## 2023-04-25 DIAGNOSIS — J34.829 NASAL VALVE COLLAPSE: ICD-10-CM

## 2023-04-25 DIAGNOSIS — Z98.890 HISTORY OF ENDOSCOPIC SINUS SURGERY: ICD-10-CM

## 2023-04-26 RX ORDER — ALBUTEROL SULFATE 90 UG/1
AEROSOL, METERED RESPIRATORY (INHALATION)
Qty: 17 G | Refills: 0 | Status: SHIPPED | OUTPATIENT
Start: 2023-04-26 | End: 2023-09-14

## 2023-04-26 RX ORDER — FLUTICASONE PROPIONATE AND SALMETEROL 250; 50 UG/1; UG/1
POWDER RESPIRATORY (INHALATION)
Qty: 3 EACH | Refills: 3 | Status: SHIPPED | OUTPATIENT
Start: 2023-04-26 | End: 2023-09-14

## 2023-04-26 RX ORDER — BUDESONIDE 0.5 MG/2ML
INHALANT ORAL
Qty: 200 ML | Refills: 11 | Status: SHIPPED | OUTPATIENT
Start: 2023-04-26 | End: 2024-01-04

## 2023-04-26 RX ORDER — TRIAMCINOLONE ACETONIDE 1 MG/G
CREAM TOPICAL 2 TIMES DAILY
Qty: 45 G | Refills: 0 | Status: SHIPPED | OUTPATIENT
Start: 2023-04-26

## 2023-04-26 NOTE — TELEPHONE ENCOUNTER
Provider signed & RX transmitted to [facility]  Pt is having extreme difficulty with [employer] billing & shipping medications  Currently out of the the attached meds  Writer sending to [facility] per patients request

## 2023-04-26 NOTE — TELEPHONE ENCOUNTER
albuteral      Last Written Prescription Date:  8/8/22  Last Fill Quantity: 18 g,   # refills: 3  Last Office Visit: 3/23/23  Future Office visit:

## 2023-05-09 ENCOUNTER — MYC MEDICAL ADVICE (OUTPATIENT)
Dept: FAMILY MEDICINE | Facility: OTHER | Age: 43
End: 2023-05-09

## 2023-05-10 ASSESSMENT — ASTHMA QUESTIONNAIRES
QUESTION_1 LAST FOUR WEEKS HOW MUCH OF THE TIME DID YOUR ASTHMA KEEP YOU FROM GETTING AS MUCH DONE AT WORK, SCHOOL OR AT HOME: NONE OF THE TIME
QUESTION_4 LAST FOUR WEEKS HOW OFTEN HAVE YOU USED YOUR RESCUE INHALER OR NEBULIZER MEDICATION (SUCH AS ALBUTEROL): TWO OR THREE TIMES PER WEEK
QUESTION_3 LAST FOUR WEEKS HOW OFTEN DID YOUR ASTHMA SYMPTOMS (WHEEZING, COUGHING, SHORTNESS OF BREATH, CHEST TIGHTNESS OR PAIN) WAKE YOU UP AT NIGHT OR EARLIER THAN USUAL IN THE MORNING: ONCE OR TWICE
ACT_TOTALSCORE: 19
QUESTION_5 LAST FOUR WEEKS HOW WOULD YOU RATE YOUR ASTHMA CONTROL: SOMEWHAT CONTROLLED
ACT_TOTALSCORE: 19
QUESTION_2 LAST FOUR WEEKS HOW OFTEN HAVE YOU HAD SHORTNESS OF BREATH: ONCE OR TWICE A WEEK

## 2023-05-10 NOTE — TELEPHONE ENCOUNTER
"Patient completed MyChart ACT per [facility] quality measure patient outreach. This writer notes that patient reports asthma is \"somewhat controlled\" presently with total ACT score is 19. Will encourage patient to schedule an asthma visit with PCP Axel.          10/7/2022     2:48 PM 3/23/2023    10:18 AM 5/10/2023     8:26 AM   ACT Total Scores   ACT TOTAL SCORE (Goal Greater than or Equal to 20) 21 15 19   In the past 12 months, how many times did you visit the emergency room for your asthma without being admitted to the hospital? 0 0 0   In the past 12 months, how many times were you hospitalized overnight because of your asthma? 0 0 0      Lakeisha Cardenas RN    "

## 2023-06-09 ENCOUNTER — MYC MEDICAL ADVICE (OUTPATIENT)
Dept: FAMILY MEDICINE | Facility: OTHER | Age: 43
End: 2023-06-09

## 2023-06-21 ENCOUNTER — MYC MEDICAL ADVICE (OUTPATIENT)
Dept: FAMILY MEDICINE | Facility: OTHER | Age: 43
End: 2023-06-21

## 2023-06-21 DIAGNOSIS — J45.40 MODERATE PERSISTENT ASTHMA WITHOUT COMPLICATION: ICD-10-CM

## 2023-06-21 DIAGNOSIS — K21.9 GASTROESOPHAGEAL REFLUX DISEASE, UNSPECIFIED WHETHER ESOPHAGITIS PRESENT: ICD-10-CM

## 2023-06-21 RX ORDER — OMEPRAZOLE 40 MG/1
40 CAPSULE, DELAYED RELEASE ORAL DAILY
Qty: 90 CAPSULE | Refills: 0 | Status: SHIPPED | OUTPATIENT
Start: 2023-06-21 | End: 2023-09-18

## 2023-06-21 NOTE — TELEPHONE ENCOUNTER
Singulair  Last Written Prescription Date:  3.9.2023  Last Fill Quantity: 90,   # refills: 3  Last Office Visit: 3.23.2023  Future Office visit:    Next 5 appointments (look out 90 days)    Sep 14, 2023  8:00 AM  (Arrive by 7:45 AM)  PHYSICAL with Estelle Reyna CNP  [facility] [address]  [facility] [address]  [phone]           Routing refill request to provider for review/approval because:    Leukotriene Inhibitors Protocol Failed 06/21/2023 02:12 PM   Protocol Details  Asthma control assessment score within normal limits in last 6 months     Emerita ARMAS RN

## 2023-06-22 RX ORDER — MONTELUKAST SODIUM 10 MG/1
1 TABLET ORAL AT BEDTIME
Qty: 90 TABLET | Refills: 0 | Status: SHIPPED | OUTPATIENT
Start: 2023-06-22 | End: 2023-09-14
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
## 2023-09-13 ASSESSMENT — ENCOUNTER SYMPTOMS
DIARRHEA: 0
NAUSEA: 0
MYALGIAS: 0
HEMATURIA: 0
PALPITATIONS: 0
PARESTHESIAS: 0
DYSURIA: 0
COUGH: 1
DIZZINESS: 0
EYE PAIN: 0
CONSTIPATION: 0
SHORTNESS OF BREATH: 1
FEVER: 0
SORE THROAT: 0
FREQUENCY: 0
JOINT SWELLING: 0
HEMATOCHEZIA: 0
CHILLS: 0
ABDOMINAL PAIN: 0
ARTHRALGIAS: 0
NERVOUS/ANXIOUS: 1
WEAKNESS: 0
HEARTBURN: 1
HEADACHES: 0

## 2023-09-13 ASSESSMENT — ASTHMA QUESTIONNAIRES: ACT_TOTALSCORE: 17

## 2023-09-14 ENCOUNTER — OFFICE VISIT (OUTPATIENT)
Dept: FAMILY MEDICINE | Facility: OTHER | Age: 43
End: 2023-09-14
Attending: NURSE PRACTITIONER
Payer: COMMERCIAL

## 2023-09-14 ENCOUNTER — MYC MEDICAL ADVICE (OUTPATIENT)
Dept: FAMILY MEDICINE | Facility: OTHER | Age: 43
End: 2023-09-14

## 2023-09-14 VITALS
SYSTOLIC BLOOD PRESSURE: 138 MMHG | BODY MASS INDEX: 26.79 KG/M2 | RESPIRATION RATE: 18 BRPM | HEIGHT: 70 IN | HEART RATE: 76 BPM | TEMPERATURE: 98.2 F | DIASTOLIC BLOOD PRESSURE: 78 MMHG | WEIGHT: 187.1 LBS | OXYGEN SATURATION: 96 %

## 2023-09-14 DIAGNOSIS — Z00.00 PREVENTATIVE HEALTH CARE: Primary | ICD-10-CM

## 2023-09-14 DIAGNOSIS — L30.9 ECZEMA, UNSPECIFIED TYPE: ICD-10-CM

## 2023-09-14 DIAGNOSIS — J45.40 MODERATE PERSISTENT ASTHMA WITHOUT COMPLICATION: ICD-10-CM

## 2023-09-14 DIAGNOSIS — K21.00 GASTROESOPHAGEAL REFLUX DISEASE WITH ESOPHAGITIS WITHOUT HEMORRHAGE: Chronic | ICD-10-CM

## 2023-09-14 PROCEDURE — 99396 PREV VISIT EST AGE 40-64: CPT | Performed by: NURSE PRACTITIONER

## 2023-09-14 RX ORDER — ALBUTEROL SULFATE 90 UG/1
1-2 AEROSOL, METERED RESPIRATORY (INHALATION) 4 TIMES DAILY
Qty: 17 G | Refills: 2 | Status: SHIPPED | OUTPATIENT
Start: 2023-09-14 | End: 2023-11-28

## 2023-09-14 RX ORDER — FLUTICASONE PROPIONATE AND SALMETEROL 250; 50 UG/1; UG/1
POWDER RESPIRATORY (INHALATION)
Qty: 3 EACH | Refills: 11 | Status: SHIPPED | OUTPATIENT
Start: 2023-09-14 | End: 2023-12-01

## 2023-09-14 RX ORDER — MONTELUKAST SODIUM 10 MG/1
1 TABLET ORAL AT BEDTIME
Qty: 90 TABLET | Refills: 3 | Status: SHIPPED | OUTPATIENT
Start: 2023-09-14 | End: 2024-04-01

## 2023-09-14 ASSESSMENT — ENCOUNTER SYMPTOMS
HEMATOCHEZIA: 0
HEMATURIA: 0
PALPITATIONS: 0
PARESTHESIAS: 0
JOINT SWELLING: 0
HEARTBURN: 1
FEVER: 0
COUGH: 1
SHORTNESS OF BREATH: 1
SORE THROAT: 0
DIARRHEA: 0
HEADACHES: 0
MYALGIAS: 0
NERVOUS/ANXIOUS: 1
ARTHRALGIAS: 0
WEAKNESS: 0
NAUSEA: 0
DYSURIA: 0
ABDOMINAL PAIN: 0
EYE PAIN: 0
FREQUENCY: 0
CHILLS: 0
CONSTIPATION: 0
DIZZINESS: 0

## 2023-09-14 ASSESSMENT — PAIN SCALES - GENERAL: PAINLEVEL: NO PAIN (0)

## 2023-09-14 NOTE — LETTER
September 14, 2023      Solis Prescott  [address] [place] [id number]        To Whom It May Concern,     Solis has been diagnosed with asthma. On file, we have this dating back to at least 2007. While it is not possible to determine the exact cause of this asthma, if he went into  service without any history of asthma (no local records dating back to that time) and it developed in adulthood, it is reasonable that there is a 50/50 probability that it was due to environmental exposures from service.         Sincerely,        Estelle Reyna, CNP

## 2023-09-15 DIAGNOSIS — K21.9 GASTROESOPHAGEAL REFLUX DISEASE, UNSPECIFIED WHETHER ESOPHAGITIS PRESENT: ICD-10-CM

## 2023-09-18 RX ORDER — OMEPRAZOLE 40 MG/1
40 CAPSULE, DELAYED RELEASE ORAL DAILY
Qty: 90 CAPSULE | Refills: 3 | Status: SHIPPED | OUTPATIENT
Start: 2023-09-18 | End: 2024-02-21

## 2023-09-18 NOTE — TELEPHONE ENCOUNTER
omeprazole (PRILOSEC) 40 MG DR capsule 90 capsule 0 6/21/2023     Last Office Visit: 09/14/2023  Future Office visit:       Routing refill request to provider for review/approval because:

## 2023-09-19 ENCOUNTER — MYC MEDICAL ADVICE (OUTPATIENT)
Dept: FAMILY MEDICINE | Facility: OTHER | Age: 43
End: 2023-09-19

## 2023-09-19 DIAGNOSIS — R69 PSYCHIATRIC DIAGNOSIS DEFERRED: Primary | ICD-10-CM

## 2023-09-21 NOTE — TELEPHONE ENCOUNTER
Patient requests referral for therapy.               10/19/2021     8:00 AM 9/22/2022     9:01 AM 3/23/2023    10:18 AM   JOHN-7 SCORE   Total Score 1 6 6            10/19/2021     8:00 AM 9/22/2022     9:01 AM 3/23/2023    10:18 AM   PHQ   PHQ-9 Total Score 10 6 6   Q9: Thoughts of better off dead/self-harm past 2 weeks Not at all Not at all Not at all

## 2023-10-23 ENCOUNTER — OFFICE VISIT (OUTPATIENT)
Dept: PSYCHOLOGY | Facility: OTHER | Age: 43
End: 2023-10-23
Attending: MARRIAGE & FAMILY THERAPIST
Payer: COMMERCIAL

## 2023-10-23 DIAGNOSIS — F41.9 ANXIETY: Primary | ICD-10-CM

## 2023-10-23 DIAGNOSIS — R69 PSYCHIATRIC DIAGNOSIS DEFERRED: ICD-10-CM

## 2023-10-23 PROCEDURE — 90791 PSYCH DIAGNOSTIC EVALUATION: CPT | Performed by: MARRIAGE & FAMILY THERAPIST

## 2023-10-27 PROBLEM — F41.9 ANXIETY: Status: ACTIVE | Noted: 2023-10-27

## 2023-10-27 NOTE — PROGRESS NOTES
[facility]   Mental Health & Addiction Services     Progress Note - Initial Visit    Patient  Name:  Solis Prescott Date: 10/23/23         Service Type: Individual     Visit Start Time: 0900  Visit End Time: 1030    Visit #: 1    Attendees: Client    Service Modality:  In-person       DATA:   Interactive Complexity: No   Crisis: No     Presenting Concerns/  Current Stressors:   Anger which goes back to childhood age 9+; possible PTSD?      ASSESSMENT:  Mental Status Assessment:  Appearance:   Appropriate   Eye Contact:   Good   Psychomotor Behavior: Restless   Attitude:   Cooperative  Friendly  Orientation:   All  Speech   Rate / Production: Normal/ Responsive   Volume:  Normal   Mood:    Angry  Anxious   Affect:    Expansive   Thought Content:  Perseverative Referential Thinking  Rumination  Obsessions Compulsions  Thought Form:  Blocking  Illogical  Insight:    Poor       Safety Issues and Plan for Safety and Risk Management:   [place] Suicide Severity Rating Scale (Short Version)      9/30/2021     8:00 AM 10/6/2021     3:00 PM 10/19/2021     7:00 AM 12/16/2021    12:54 PM 3/29/2022     9:00 AM 9/22/2022     8:00 AM 10/7/2022     2:00 PM   [place] Suicide Severity Rating (Short Version)   Over the past 2 weeks have you felt down, depressed, or hopeless? no no no no no no no   Over the past 2 weeks have you had thoughts of killing yourself? no no no no no no no   Have you ever attempted to kill yourself? no no no no no       Patient denies current fears or concerns for personal safety.  Patient denies current or recent suicidal ideation or behaviors.  Patient denies current or recent homicidal ideation or behaviors.  Patient denies current or recent self injurious behavior or ideation.  Patient denies other safety concerns.  Recommended that patient call 911 or go to the local ED should there be a change in any of these risk factors.  Patient reports there are firearms in the house. The  firearms are secured in a locked space.     Diagnostic Criteria:  Unspecified Anxiety Disorder   - Excessive anxiety and worry about a number of events or activities (such as work or school performance).    - The person finds it difficult to control the worry.   - Restlessness or feeling keyed up or on edge.    - Being easily fatigued.    - Difficulty concentrating or mind going blank.    - Irritability.    - Muscle tension.    - Sleep disturbance (difficulty falling or staying asleep, or restless unsatisfying sleep).       DSM5 Diagnoses: (Sustained by DSM5 Criteria Listed Above)  Diagnoses: 300.02 (F41.1) Generalized Anxiety Disorder  Psychosocial & Contextual Factors: Unknown at this time  WHODAS 2.0 (12 item):        No data to display              Intervention:   Educated on what is anger and its sources  Collateral Reports Completed:  Not Applicable      PLAN: (Homework, other):  1. Provider will continue Diagnostic Assessment.  Patient was given the following to do until next session:  Define healthy ways of expressing anger    2. Provider recommended the following referrals: none.      3.  Suicide Risk and Safety Concerns were assessed for Solis Prescott.    Patient meets the following risk assessment and triage: Patient denied any current/recent/lifetime history of suicidal ideation and/or behaviors.  No safety plan indicated at this time.       Adrien Harrell, KIKA  October 27, 2023

## 2023-11-20 ENCOUNTER — OFFICE VISIT (OUTPATIENT)
Dept: PSYCHOLOGY | Facility: OTHER | Age: 43
End: 2023-11-20
Attending: MARRIAGE & FAMILY THERAPIST
Payer: COMMERCIAL

## 2023-11-20 DIAGNOSIS — F41.9 ANXIETY: Primary | ICD-10-CM

## 2023-11-20 PROCEDURE — 90837 PSYTX W PT 60 MINUTES: CPT | Performed by: MARRIAGE & FAMILY THERAPIST
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
## 2023-11-27 ENCOUNTER — MYC MEDICAL ADVICE (OUTPATIENT)
Dept: FAMILY MEDICINE | Facility: OTHER | Age: 43
End: 2023-11-27

## 2023-11-27 ASSESSMENT — ASTHMA QUESTIONNAIRES: ACT_TOTALSCORE: 19

## 2023-11-28 ENCOUNTER — MYC REFILL (OUTPATIENT)
Dept: FAMILY MEDICINE | Facility: OTHER | Age: 43
End: 2023-11-28

## 2023-11-28 DIAGNOSIS — J45.40 MODERATE PERSISTENT ASTHMA WITHOUT COMPLICATION: ICD-10-CM

## 2023-11-29 RX ORDER — ALBUTEROL SULFATE 90 UG/1
1-2 AEROSOL, METERED RESPIRATORY (INHALATION) 4 TIMES DAILY
Qty: 17 G | Refills: 2 | Status: SHIPPED | OUTPATIENT
Start: 2023-11-29 | End: 2024-06-19

## 2023-11-29 NOTE — TELEPHONE ENCOUNTER
Albuterol      Last Written Prescription Date:  09/14/23  Last Fill Quantity: 17g,   # refills: 2  Last Office Visit: 09/14/23  Future Office visit:    Next 5 appointments (look out 90 days)      Dec 04, 2023 10:00 AM  (Arrive by 9:45 AM)  Return Visit with KIKA Mckeon  [facility] ([facility] ) [address] [phone]     Dec 18, 2023 10:00 AM  (Arrive by 9:45 AM)  Return Visit with KIKA Mckeon  [facility] ([facility] ) [address] [phone]

## 2023-12-01 ENCOUNTER — TELEPHONE (OUTPATIENT)
Dept: FAMILY MEDICINE | Facility: OTHER | Age: 43
End: 2023-12-01

## 2023-12-01 DIAGNOSIS — J45.40 MODERATE PERSISTENT ASTHMA WITHOUT COMPLICATION: ICD-10-CM

## 2023-12-01 RX ORDER — FLUTICASONE PROPIONATE AND SALMETEROL 250; 50 UG/1; UG/1
POWDER RESPIRATORY (INHALATION)
Qty: 3 EACH | Refills: 11 | Status: SHIPPED | OUTPATIENT
Start: 2023-12-01

## 2023-12-01 NOTE — TELEPHONE ENCOUNTER
Reason for call:  Medication      Have you contacted your pharmacy?   Patient has done research and found the most pharmacies are out of  fluticasone-salmeterol (ADVAIR DISKUS) 250-50 MCG/ACT inhaler : does not want to deal with [facility], [facility], [facility] a few left  Medication fluticasone-salmeterol (ADVAIR DISKUS) 250-50 MCG/ACT inhaler   What Pharm[facility] do you use? [facility], please route asap as he is almost out and they only have a few left  Thank you      (Please note that the turn-around-time for prescriptions is 72 business hours; I am sending your request at this time. SEND TO  [facility]  )

## 2023-12-04 ENCOUNTER — OFFICE VISIT (OUTPATIENT)
Dept: PSYCHOLOGY | Facility: OTHER | Age: 43
End: 2023-12-04
Attending: MARRIAGE & FAMILY THERAPIST
Payer: COMMERCIAL

## 2023-12-04 DIAGNOSIS — F41.9 ANXIETY: Primary | ICD-10-CM

## 2023-12-04 PROCEDURE — 90837 PSYTX W PT 60 MINUTES: CPT | Performed by: MARRIAGE & FAMILY THERAPIST
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
## 2023-12-05 NOTE — TELEPHONE ENCOUNTER
Patient completed MyChart ACT per [facility] quality measure patient outreach. This writer notes that patient reports asthma is well controlled presently with total ACT score is 19, which is just under asthma goal of ACT total score >= 20. Will continue to monitor ACT trends and outreach with patient if ACT control decreases.              5/10/2023     8:26 AM 9/13/2023     9:51 AM 11/27/2023     9:44 AM   ACT Total Scores   ACT TOTAL SCORE (Goal Greater than or Equal to 20) 19 17 19   In the past 12 months, how many times did you visit the emergency room for your asthma without being admitted to the hospital? 0 0 0   In the past 12 months, how many times were you hospitalized overnight because of your asthma? 0 0 0      Lakeisha Cardenas RN

## 2023-12-18 ENCOUNTER — OFFICE VISIT (OUTPATIENT)
Dept: PSYCHOLOGY | Facility: OTHER | Age: 43
End: 2023-12-18
Attending: MARRIAGE & FAMILY THERAPIST
Payer: COMMERCIAL

## 2023-12-18 DIAGNOSIS — F41.9 ANXIETY: Primary | ICD-10-CM

## 2023-12-18 PROCEDURE — 90837 PSYTX W PT 60 MINUTES: CPT | Performed by: MARRIAGE & FAMILY THERAPIST
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
## 2024-01-02 ENCOUNTER — TELEPHONE (OUTPATIENT)
Dept: FAMILY MEDICINE | Facility: OTHER | Age: 44
End: 2024-01-02

## 2024-01-02 NOTE — PROGRESS NOTES
Assessment & Plan     Chronic bilateral low back pain with bilateral sciatica  Acute on chronic with current flare. Since this is chronic we will get imaging today and move forward with an MRI. For now, Solis will start prednisone and after completing that use naproxen twice a day. May move to as needed bases after symptoms have significantly improved. Anticipated timeframe is 2-3 weeks. Will treat spasms with muscle relaxer.   - XR LUMBAR SPINE 2/3 VIEWS (Clinic Performed); Future  - MR Lumbar Spine w/o Contrast; Future  - naproxen (NAPROSYN) 500 MG tablet; Take 1 tablet (500 mg) by mouth 2 times daily (with meals) Start after done with prednisone.  - predniSONE (DELTASONE) 20 MG tablet; Take 3 tabs by mouth daily x 3 days, then 2 tabs daily x 3 days, then 1 tab daily x 3 days, then 1/2 tab daily x 3 days.    Back muscle spasm  - tiZANidine (ZANAFLEX) 2 MG tablet; Take 1-2 tablets (2-4 mg) by mouth 3 times daily    Weakness of both lower extremities  - XR LUMBAR SPINE 2/3 VIEWS (Clinic Performed); Future  - MR Lumbar Spine w/o Contrast; Future    Nasal valve collapse  Refill per request. Topic not otherwise addressed.   - budesonide (PULMICORT) 0.5 MG/2ML neb solution; Squirt entire vial into siabel med saline solution, mix, and irrigate each nostril until entire bottle empty.  Do this twice daily.    History of endoscopic sinus surgery  Refill per request. Topic not otherwise addressed.   - budesonide (PULMICORT) 0.5 MG/2ML neb solution; Squirt entire vial into isabel med saline solution, mix, and irrigate each nostril until entire bottle empty.  Do this twice daily.    Ordering of each unique test  Prescription drug management  I spent a total of 24 minutes on the day of the visit.   Time spent by me doing chart review, history and exam, documentation and further activities per the note           No follow-ups on file.    Estelle Reyna, CNP  [facility]    Yamila Ely is a 43 year old,  "presenting for the following health issues:  Back Pain      HPI     Pain History:  When did you first notice your pain?  Initially noticed pain years ago, 1990s? Current flair 12/31/2023   Have you seen anyone else for your pain? No  How has your pain affected your ability to work? 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 Back Pain  Onset/Duration: 12/31/2023  Description:   Location of pain: low back center  Character of pain: sharp, stabbing, gnawing, burning, and cramping  Pain radiation: radiates into the right buttocks, radiates into the right leg, radiates into the left buttocks, and radiates into the left leg  This is the first time I have had pain in both legs. In past it has been in right leg.   Weakness of legs with change of position  and and walking. Has not fallen but feels unsteady. Did loose balance and \"head butted the wall\".     New numbness or weakness in legs, not attributed to pain: no   Intensity: Currently 6/10  Progression of Symptoms: worsening  History:   Specific cause: lifting, near-fall  Pain interferes with job: YES  History of back problems: yes. Chronic but not frequent.   Any previous MRI or X-rays: None  Sees a specialist for back pain: No  Alleviating factors:   Improved by: none    Precipitating factors:  Worsened by: Lifting, Bending, Standing, Sitting, and Coughing  Therapies tried and outcome: acetaminophen (Tylenol), cold, heat, NSAIDs, and rest    Accompanying Signs & Symptoms:  Risk of Fracture: None  Risk of Cauda Equina: None  Risk of Infection: None  Risk of Cancer: None  Risk of Ankylosing Spondylitis: Onset at age <35, male, AND morning back stiffness  no       Review of Systems   Constitutional, HEENT, cardiovascular, pulmonary, gi and gu systems are negative, except as otherwise noted.      Objective    BP (!) 132/90 (BP Location: Left arm, " Patient Position: Sitting, Cuff Size: Adult Regular)   Pulse 90   Temp 97.6  F (36.4  C) (Tympanic)   Resp 20   Wt 86.8 kg (191 lb 4.8 oz)   SpO2 99%   BMI 27.45 kg/m    Body mass index is 27.45 kg/m .      Physical Exam   GENERAL: healthy, alert and no distress  MS: bilateral lumbar spasms and tenderness of paraspinal muscles. Increased time moving from sitting to standing. Uses desk for support. Unable to adequately assess straight leg lift due to pain. Circulation and sensation intact distally. Ambulating independently.  Extremities normal- no gross deformities noted  PSYCH: mentation appears normal, affect normal/bright        Results for orders placed or performed in visit on 01/04/24   XR LUMBAR SPINE 2/3 VIEWS (Clinic Performed)     Status: None    Narrative    Exam: XR LUMBAR SPINE 2/3 VIEWS    Exam Reason: chronic with acute flair. weakness of legs.; Chronic  bilateral low back pain with bilateral sciatica; Chronic bilateral low  back pain with bilateral sciatica; Chronic bilateral low back pain  with bilateral sciatica; Weakness of both lower extremities    Comparison: None.    Findings:   AP, lateral, and lateral coned down to the lumbosacral junction  radiographic views were obtained.     No acute fracture or subluxation.    The vertebrae are normal in alignment.     There is disc space narrowing at L5-S1.    The paravertebral soft tissues are normal.      Impression    IMPRESSION:      No acute fracture or subluxation.    CHERIE SHELTON MD         SYSTEM ID:  [id number]

## 2024-01-02 NOTE — TELEPHONE ENCOUNTER
Please schedule for appointment for sciatica. I do not see where I have seen him for this. Needs a visit.

## 2024-01-02 NOTE — TELEPHONE ENCOUNTER
10:09 AM    Reason for Call: Phone Call    Description: Patient called in stating that he has sciatica and is wondering what he needs to do, if he needs an xray or what needs to be done next. Patient also saw the dentist and the dentist getting physical therapy with Ovidio Samuel and would like a referral. Please call patient back.     Was an appointment offered for this call? No  If yes : Appointment type              Date    Preferred method for responding to this message: Telephone Call  What is your phone number ? [phone]     If we cannot reach you directly, may we leave a detailed response at the number you provided? Yes    Can this message wait until your PCP/provider returns, if available today? Not applicable, PROVIDER IN CLINIC    Federica Garza

## 2024-01-03 ASSESSMENT — ANXIETY QUESTIONNAIRES
4. TROUBLE RELAXING: SEVERAL DAYS
7. FEELING AFRAID AS IF SOMETHING AWFUL MIGHT HAPPEN: NOT AT ALL
5. BEING SO RESTLESS THAT IT IS HARD TO SIT STILL: NOT AT ALL
GAD7 TOTAL SCORE: 5
1. FEELING NERVOUS, ANXIOUS, OR ON EDGE: SEVERAL DAYS
IF YOU CHECKED OFF ANY PROBLEMS ON THIS QUESTIONNAIRE, HOW DIFFICULT HAVE THESE PROBLEMS MADE IT FOR YOU TO DO YOUR WORK, TAKE CARE OF THINGS AT HOME, OR GET ALONG WITH OTHER PEOPLE: SOMEWHAT DIFFICULT
8. IF YOU CHECKED OFF ANY PROBLEMS, HOW DIFFICULT HAVE THESE MADE IT FOR YOU TO DO YOUR WORK, TAKE CARE OF THINGS AT HOME, OR GET ALONG WITH OTHER PEOPLE?: SOMEWHAT DIFFICULT
GAD7 TOTAL SCORE: 5
2. NOT BEING ABLE TO STOP OR CONTROL WORRYING: SEVERAL DAYS
GAD7 TOTAL SCORE: 5
6. BECOMING EASILY ANNOYED OR IRRITABLE: SEVERAL DAYS
3. WORRYING TOO MUCH ABOUT DIFFERENT THINGS: SEVERAL DAYS
7. FEELING AFRAID AS IF SOMETHING AWFUL MIGHT HAPPEN: NOT AT ALL

## 2024-01-03 ASSESSMENT — PATIENT HEALTH QUESTIONNAIRE - PHQ9
10. IF YOU CHECKED OFF ANY PROBLEMS, HOW DIFFICULT HAVE THESE PROBLEMS MADE IT FOR YOU TO DO YOUR WORK, TAKE CARE OF THINGS AT HOME, OR GET ALONG WITH OTHER PEOPLE: SOMEWHAT DIFFICULT
SUM OF ALL RESPONSES TO PHQ QUESTIONS 1-9: 5
SUM OF ALL RESPONSES TO PHQ QUESTIONS 1-9: 5

## 2024-01-04 ENCOUNTER — OFFICE VISIT (OUTPATIENT)
Dept: FAMILY MEDICINE | Facility: OTHER | Age: 44
End: 2024-01-04
Attending: NURSE PRACTITIONER
Payer: COMMERCIAL

## 2024-01-04 ENCOUNTER — ANCILLARY PROCEDURE (OUTPATIENT)
Dept: GENERAL RADIOLOGY | Facility: OTHER | Age: 44
End: 2024-01-04
Attending: NURSE PRACTITIONER
Payer: COMMERCIAL

## 2024-01-04 VITALS
BODY MASS INDEX: 27.45 KG/M2 | WEIGHT: 191.3 LBS | RESPIRATION RATE: 20 BRPM | DIASTOLIC BLOOD PRESSURE: 90 MMHG | TEMPERATURE: 97.6 F | SYSTOLIC BLOOD PRESSURE: 132 MMHG | OXYGEN SATURATION: 99 % | HEART RATE: 90 BPM

## 2024-01-04 DIAGNOSIS — M62.830 BACK MUSCLE SPASM: ICD-10-CM

## 2024-01-04 DIAGNOSIS — J34.829 NASAL VALVE COLLAPSE: ICD-10-CM

## 2024-01-04 DIAGNOSIS — M54.42 CHRONIC BILATERAL LOW BACK PAIN WITH BILATERAL SCIATICA: ICD-10-CM

## 2024-01-04 DIAGNOSIS — Z98.890 HISTORY OF ENDOSCOPIC SINUS SURGERY: ICD-10-CM

## 2024-01-04 DIAGNOSIS — M54.41 CHRONIC BILATERAL LOW BACK PAIN WITH BILATERAL SCIATICA: ICD-10-CM

## 2024-01-04 DIAGNOSIS — R29.898 WEAKNESS OF BOTH LOWER EXTREMITIES: ICD-10-CM

## 2024-01-04 DIAGNOSIS — G89.29 CHRONIC BILATERAL LOW BACK PAIN WITH BILATERAL SCIATICA: ICD-10-CM

## 2024-01-04 DIAGNOSIS — G89.29 CHRONIC BILATERAL LOW BACK PAIN WITH BILATERAL SCIATICA: Primary | ICD-10-CM

## 2024-01-04 DIAGNOSIS — M54.42 CHRONIC BILATERAL LOW BACK PAIN WITH BILATERAL SCIATICA: Primary | ICD-10-CM

## 2024-01-04 DIAGNOSIS — M54.41 CHRONIC BILATERAL LOW BACK PAIN WITH BILATERAL SCIATICA: Primary | ICD-10-CM

## 2024-01-04 PROCEDURE — 72100 X-RAY EXAM L-S SPINE 2/3 VWS: CPT | Mod: TC | Performed by: STUDENT IN AN ORGANIZED HEALTH CARE EDUCATION/TRAINING PROGRAM

## 2024-01-04 PROCEDURE — 99214 OFFICE O/P EST MOD 30 MIN: CPT | Performed by: NURSE PRACTITIONER

## 2024-01-04 RX ORDER — TIZANIDINE 2 MG/1
2-4 TABLET ORAL 3 TIMES DAILY
Qty: 60 TABLET | Refills: 0 | Status: SHIPPED | OUTPATIENT
Start: 2024-01-04

## 2024-01-04 RX ORDER — BUDESONIDE 0.5 MG/2ML
INHALANT ORAL
Qty: 200 ML | Refills: 11 | Status: SHIPPED | OUTPATIENT
Start: 2024-01-04

## 2024-01-04 RX ORDER — PREDNISONE 20 MG/1
TABLET ORAL
Qty: 20 TABLET | Refills: 0 | Status: SHIPPED | OUTPATIENT
Start: 2024-01-04 | End: 2024-04-30

## 2024-01-04 RX ORDER — NAPROXEN 500 MG/1
500 TABLET ORAL 2 TIMES DAILY WITH MEALS
Qty: 60 TABLET | Refills: 0 | Status: SHIPPED | OUTPATIENT
Start: 2024-01-04

## 2024-01-04 ASSESSMENT — ASTHMA QUESTIONNAIRES
ACT_TOTALSCORE: 21
QUESTION_3 LAST FOUR WEEKS HOW OFTEN DID YOUR ASTHMA SYMPTOMS (WHEEZING, COUGHING, SHORTNESS OF BREATH, CHEST TIGHTNESS OR PAIN) WAKE YOU UP AT NIGHT OR EARLIER THAN USUAL IN THE MORNING: NOT AT ALL
QUESTION_4 LAST FOUR WEEKS HOW OFTEN HAVE YOU USED YOUR RESCUE INHALER OR NEBULIZER MEDICATION (SUCH AS ALBUTEROL): TWO OR THREE TIMES PER WEEK
QUESTION_2 LAST FOUR WEEKS HOW OFTEN HAVE YOU HAD SHORTNESS OF BREATH: ONCE OR TWICE A WEEK
ACT_TOTALSCORE: 21
QUESTION_1 LAST FOUR WEEKS HOW MUCH OF THE TIME DID YOUR ASTHMA KEEP YOU FROM GETTING AS MUCH DONE AT WORK, SCHOOL OR AT HOME: NONE OF THE TIME
QUESTION_5 LAST FOUR WEEKS HOW WOULD YOU RATE YOUR ASTHMA CONTROL: WELL CONTROLLED

## 2024-01-04 ASSESSMENT — PAIN SCALES - GENERAL: PAINLEVEL: SEVERE PAIN (6)

## 2024-01-04 NOTE — LETTER
January 4, 2024      Solis Prescott   [address] [place] [id number]        To Whom It May Concern:    Solis Prescott  was seen on 1/4/24.  Please excuse him for any missed time 1/2/24-1/12/23 due to injury.         Sincerely,        Estelle Reyna, CNP

## 2024-01-08 ENCOUNTER — OFFICE VISIT (OUTPATIENT)
Dept: PSYCHOLOGY | Facility: OTHER | Age: 44
End: 2024-01-08
Attending: MARRIAGE & FAMILY THERAPIST
Payer: COMMERCIAL

## 2024-01-12 ENCOUNTER — HOSPITAL ENCOUNTER (OUTPATIENT)
Dept: MRI IMAGING | Facility: HOSPITAL | Age: 44
Discharge: HOME | End: 2024-01-12
Attending: NURSE PRACTITIONER | Admitting: NURSE PRACTITIONER
Payer: COMMERCIAL

## 2024-01-12 DIAGNOSIS — M54.41 CHRONIC BILATERAL LOW BACK PAIN WITH BILATERAL SCIATICA: ICD-10-CM

## 2024-01-12 DIAGNOSIS — R29.898 WEAKNESS OF BOTH LOWER EXTREMITIES: ICD-10-CM

## 2024-01-12 DIAGNOSIS — G89.29 CHRONIC BILATERAL LOW BACK PAIN WITH BILATERAL SCIATICA: ICD-10-CM

## 2024-01-12 DIAGNOSIS — M54.42 CHRONIC BILATERAL LOW BACK PAIN WITH BILATERAL SCIATICA: ICD-10-CM

## 2024-01-12 PROCEDURE — 72148 MRI LUMBAR SPINE W/O DYE: CPT

## 2024-01-15 ENCOUNTER — MYC MEDICAL ADVICE (OUTPATIENT)
Dept: FAMILY MEDICINE | Facility: OTHER | Age: 44
End: 2024-01-15

## 2024-01-15 ENCOUNTER — TELEPHONE (OUTPATIENT)
Dept: FAMILY MEDICINE | Facility: OTHER | Age: 44
End: 2024-01-15

## 2024-01-15 DIAGNOSIS — M54.41 CHRONIC BILATERAL LOW BACK PAIN WITH BILATERAL SCIATICA: Primary | ICD-10-CM

## 2024-01-15 DIAGNOSIS — M54.42 CHRONIC BILATERAL LOW BACK PAIN WITH BILATERAL SCIATICA: Primary | ICD-10-CM

## 2024-01-15 DIAGNOSIS — M62.830 BACK MUSCLE SPASM: ICD-10-CM

## 2024-01-15 DIAGNOSIS — G89.29 CHRONIC BILATERAL LOW BACK PAIN WITH BILATERAL SCIATICA: Primary | ICD-10-CM

## 2024-01-15 NOTE — TELEPHONE ENCOUNTER
No personalized VM. No message left. I will leave Solis a message under the result note communication. I would like him to meet with a neurosurgeon re: the possible S1 impingement. If any loss of function or feeling to to bowel, bladder, or legs advised to go into the ER/ED.     KRIS [facility]is, CNP

## 2024-01-17 ENCOUNTER — TELEPHONE (OUTPATIENT)
Dept: FAMILY MEDICINE | Facility: OTHER | Age: 44
End: 2024-01-17

## 2024-01-17 NOTE — TELEPHONE ENCOUNTER
Patient called and wants more of an explanation of his results, he sent an Monitor message in regards to this.    Please call him at [phone]    Thank you,  Lottie

## 2024-01-17 NOTE — TELEPHONE ENCOUNTER
Call to pt for covid monitoring update. Previously reported exposure to daughter show tested covid+ and only symptom as of 11/2 was nasal congestion.    Pt reports still has only slight nasal congestion-but sts did lose sense of smell since last call to our Please advise

## 2024-01-19 ENCOUNTER — TELEPHONE (OUTPATIENT)
Dept: FAMILY MEDICINE | Facility: OTHER | Age: 44
End: 2024-01-19

## 2024-01-19 NOTE — TELEPHONE ENCOUNTER
Reviewed MRI. [facility] would like to look into insurance coverage prior to referral for neurosurgery. No acute concerns.  Estelle Reyna, APRN, CNP

## 2024-01-22 ENCOUNTER — OFFICE VISIT (OUTPATIENT)
Dept: PSYCHOLOGY | Facility: OTHER | Age: 44
End: 2024-01-22
Attending: MARRIAGE & FAMILY THERAPIST
Payer: COMMERCIAL

## 2024-01-22 DIAGNOSIS — F41.9 ANXIETY: Primary | ICD-10-CM

## 2024-01-22 DIAGNOSIS — F40.00 AGORAPHOBIA: ICD-10-CM

## 2024-01-22 PROCEDURE — 90837 PSYTX W PT 60 MINUTES: CPT | Performed by: MARRIAGE & FAMILY THERAPIST
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
## 2024-01-24 NOTE — TELEPHONE ENCOUNTER
Pended up neurosurgery referral to [facility] for possible SI nerve impingement per pt request.  Please review and sign if appropriate.

## 2024-01-25 NOTE — TELEPHONE ENCOUNTER
----- Message from Stephany Feliciano sent at 3/18/2019  2:46 PM CDT -----  Contact: Patient  Type: Needs Medical Advice    Who Called:  Patient  Symptoms (please be specific):  na  How long has patient had these symptoms:  neri  Pharmacy name and phone #:  neri  Best Call Back Number: [phone] (home)    Additional Information: Patient calling to discuss her home health options and status of referral. Please call to advise, thank you!     Referral ordered.  Estelle Reyna, APRN, CNP

## 2024-02-02 ENCOUNTER — MYC MEDICAL ADVICE (OUTPATIENT)
Dept: PULMONOLOGY | Facility: OTHER | Age: 44
End: 2024-02-02

## 2024-02-02 DIAGNOSIS — G47.00 INSOMNIA, UNSPECIFIED TYPE: ICD-10-CM

## 2024-02-02 RX ORDER — CLONAZEPAM 0.5 MG/1
TABLET ORAL
Qty: 60 TABLET | Refills: 2 | Status: SHIPPED | OUTPATIENT
Start: 2024-02-02 | End: 2024-09-02

## 2024-02-10 ENCOUNTER — HEALTH MAINTENANCE LETTER (OUTPATIENT)
Age: 44
End: 2024-02-10

## 2024-02-16 ENCOUNTER — TRANSFERRED RECORDS (OUTPATIENT)
Dept: HEALTH INFORMATION MANAGEMENT | Facility: CLINIC | Age: 44
End: 2024-02-16
Payer: COMMERCIAL

## 2024-02-21 ENCOUNTER — MYC REFILL (OUTPATIENT)
Dept: FAMILY MEDICINE | Facility: OTHER | Age: 44
End: 2024-02-21

## 2024-02-21 DIAGNOSIS — K21.9 GASTROESOPHAGEAL REFLUX DISEASE, UNSPECIFIED WHETHER ESOPHAGITIS PRESENT: ICD-10-CM

## 2024-02-21 NOTE — TELEPHONE ENCOUNTER
Omeprazole      Last Written Prescription Date:  09/18/23  Last Fill Quantity: 90,   # refills: 3  Last Office Visit: 01/04/24  Future Office visit:

## 2024-02-22 RX ORDER — OMEPRAZOLE 40 MG/1
40 CAPSULE, DELAYED RELEASE ORAL DAILY
Qty: 90 CAPSULE | Refills: 2 | Status: SHIPPED | OUTPATIENT
Start: 2024-02-22

## 2024-03-11 ENCOUNTER — MYC MEDICAL ADVICE (OUTPATIENT)
Dept: FAMILY MEDICINE | Facility: OTHER | Age: 44
End: 2024-03-11

## 2024-03-11 DIAGNOSIS — M54.41 CHRONIC BILATERAL LOW BACK PAIN WITH BILATERAL SCIATICA: Primary | ICD-10-CM

## 2024-03-11 DIAGNOSIS — M54.42 CHRONIC BILATERAL LOW BACK PAIN WITH BILATERAL SCIATICA: Primary | ICD-10-CM

## 2024-03-11 DIAGNOSIS — G89.29 CHRONIC BILATERAL LOW BACK PAIN WITH BILATERAL SCIATICA: Primary | ICD-10-CM

## 2024-03-12 NOTE — TELEPHONE ENCOUNTER
1. Chronic bilateral low back pain with bilateral sciatica  - Physical Therapy  Referral; Future  Estelle Reyna, APRN, CNP

## 2024-03-27 ENCOUNTER — TELEPHONE (OUTPATIENT)
Dept: FAMILY MEDICINE | Facility: OTHER | Age: 44
End: 2024-03-27

## 2024-03-27 NOTE — TELEPHONE ENCOUNTER
2:32 PM    Reason for Call: Phone Call    Description: [facility]  [facility] and they informed him they have not. Patient is wondering if the referral can get re faxed to [phone]. He would like a call once this has been done.     Was an appointment offered for this call? Yes  If yes : Appointment type              Date    Preferred method for responding to this message: Telephone Call  What is your phone number ? [phone]     If we cannot reach you directly, may we leave a detailed response at the number you provided? Yes    Can this message wait until your PCP/provider returns, if available today? Not applicable    Yadira Castaneda

## 2024-04-01 ENCOUNTER — MYC REFILL (OUTPATIENT)
Dept: FAMILY MEDICINE | Facility: OTHER | Age: 44
End: 2024-04-01

## 2024-04-01 DIAGNOSIS — J45.40 MODERATE PERSISTENT ASTHMA WITHOUT COMPLICATION: ICD-10-CM

## 2024-04-01 RX ORDER — MONTELUKAST SODIUM 10 MG/1
1 TABLET ORAL AT BEDTIME
Qty: 90 TABLET | Refills: 2 | Status: SHIPPED | OUTPATIENT
Start: 2024-04-01

## 2024-04-01 NOTE — TELEPHONE ENCOUNTER
Singulair      Last Written Prescription Date:  9/14/23  Last Fill Quantity: 90,   # refills: 3  Last Office Visit: 1/4/24  Future Office visit:       Routing refill request to provider for review/approval because:

## 2024-04-05 ENCOUNTER — TELEPHONE (OUTPATIENT)
Dept: FAMILY MEDICINE | Facility: OTHER | Age: 44
End: 2024-04-05

## 2024-04-05 NOTE — TELEPHONE ENCOUNTER
Telephone call placed to patient and provided number for records department to obtain the information that he requested.

## 2024-04-05 NOTE — TELEPHONE ENCOUNTER
Results requested: patient needs a copy of MRI results to  in [facility] .    Best number to reach patient at: [phone]     Best time to call patient: Anytime    Send to care team in [facility].

## 2024-04-29 ENCOUNTER — NURSE TRIAGE (OUTPATIENT)
Dept: FAMILY MEDICINE | Facility: OTHER | Age: 44
End: 2024-04-29

## 2024-04-29 NOTE — TELEPHONE ENCOUNTER
Patient requesting an appointment with PCP to discuss prednisone burst to help with inflammation and pain in back as it has helped in the past.      Future Appointments 4/30/2024 - 10/27/2024        Date Visit Type Length Department Provider     4/30/2024  3:00 PM OFFICE VISIT 30 min [facility] Estelle Reyna CNP    Location Instructions:     From [place] - follow [address]nbsp; Take a right at the intersection across from [employer].&nbsp; The clinic will be on your right.  From [place] - follow [address]nbsp; Take a right onto [address].&nbsp; Take a left at the intersection across from [employer].&nbsp; The clinic will be on your right.  From [place] - follow [address]nbsp; Take a left onto [address].&nbsp; Take a left at the intersection across from [employer].&nbsp; The clinic will be on your right.                     Reason for Disposition   SEVERE back pain (e.g., excruciating, unable to do any normal activities) and not improved after pain medicine and CARE ADVICE    Additional Information   Negative: Passed out (i.e., fainted, collapsed and was not responding)   Negative: Shock suspected (e.g., cold/pale/clammy skin, too weak to stand, low BP, rapid pulse)   Negative: Sounds like a life-threatening emergency to the triager   Negative: Major injury to the back (e.g., MVA, fall > 10 feet or 3 meters, penetrating injury, etc.)   Negative: Pain in the upper back over the ribs (rib cage) that radiates (travels) into the chest   Negative: Pain in the upper back over the ribs (rib cage) and worsened by coughing (or clearly increases with breathing)   Negative: Back pain during pregnancy   Negative: SEVERE back pain of sudden onset and age > 60 years   Negative: SEVERE abdominal pain (e.g., excruciating)   Negative: Abdominal pain and age > 60 years   Negative: Unable to urinate (or only a few drops) and bladder feels very full   Negative: Loss of bladder or bowel control  "(urine or bowel incontinence; wetting self, leaking stool) of new-onset   Negative: Numbness (loss of sensation) in groin or rectal area   Negative: Pain radiates into groin, scrotum   Negative: Blood in urine (red, pink, or tea-colored)   Negative: Vomiting and pain over lower ribs of back (i.e., flank - kidney area)   Negative: Weakness of a leg or foot (e.g., unable to bear weight, dragging foot)   Negative: Patient sounds very sick or weak to the triager   Negative: Fever > 100.4 F (38.0 C) and flank pain   Negative: Pain or burning with passing urine (urination)    Answer Assessment - Initial Assessment Questions  1. ONSET: \"When did the pain begin?\"       New Years Steffany  2. LOCATION: \"Where does it hurt?\" (upper, mid or lower back)      Lower back  3. SEVERITY: \"How bad is the pain?\"  (e.g., Scale 1-10; mild, moderate, or severe)    - MILD (1-3): Doesn't interfere with normal activities.     - MODERATE (4-7): Interferes with normal activities or awakens from sleep.     - SEVERE (8-10): Excruciating pain, unable to do any normal activities.       8/10  4. PATTERN: \"Is the pain constant?\" (e.g., yes, no; constant, intermittent)       constant  5. RADIATION: \"Does the pain shoot into your legs or somewhere else?\"      Both legs  6. CAUSE:  \"What do you think is causing the back pain?\"       Unknown  7. BACK OVERUSE:  \"Any recent lifting of heavy objects, strenuous work or exercise?\"      Physical therapy- seems to be worse since start, bought a  on Thursday about 65 pounds  8. MEDICINES: \"What have you taken so far for the pain?\" (e.g., nothing, acetaminophen, NSAIDS)      Taking nothing.  Stears away from NSAIDS due to elevated LFTS  9. NEUROLOGIC SYMPTOMS: \"Do you have any weakness, numbness, or problems with bowel/bladder control?\"      Weakness, sitting for extended period causes numbness, no loss of B&B control.  10. OTHER SYMPTOMS: \"Do you have any other symptoms?\" (e.g., fever, abdomen pain, " "burning with urination, blood in urine)        None  11. PREGNANCY: \"Is there any chance you are pregnant?\" \"When was your last menstrual period?\"        NA    Protocols used: Back Pain-A-[place]    "

## 2024-04-29 NOTE — PROGRESS NOTES
"  Assessment & Plan     Chronic bilateral low back pain with bilateral sciatica  Start prednisone taper. When complete then start 600 mg ibuprofen every 8 hours.     Okay to schedule a visit with your [facility] to discuss further as you are concerned this is related to your  service.    Continue with the tizanidine    - predniSONE (DELTASONE) 20 MG tablet; Take 3 tabs by mouth daily x 3 days, then 2 tabs daily x 3 days, then 1 tab daily x 3 days, then 1/2 tab daily x 3 days.    Prescription drug management  No LOS data to display   Time spent by me doing chart review, history and exam, documentation and further activities per the note      BMI  Estimated body mass index is 26.87 kg/m  as calculated from the following:    Height as of 9/14/23: 1.778 m (5' 10\").    Weight as of this encounter: 85 kg (187 lb 4.8 oz).       Return if symptoms worsen or fail to improve.    Yamila Ely is a 43 year old, presenting for the following health issues:  Back Pain    History of Present Illness       Back Pain:  He presents for follow up of back pain. Patient's back pain is a chronic problem.  Location of back pain:  Right lower back, left lower back, right buttock, left buttock, right hip and left hip  Description of back pain: burning, dull ache, sharp, shooting and stabbing  Back pain spreads: right buttocks, left buttocks, right thigh and left thigh    Since patient first noticed back pain, pain is: rapidly worsening  Does back pain interfere with his job:  Yes       He eats 2-3 servings of fruits and vegetables daily.He consumes 4 sweetened beverage(s) daily.He exercises with enough effort to increase his heart rate 9 or less minutes per day.  He exercises with enough effort to increase his heart rate 3 or less days per week.   He is taking medications regularly.       Pain History:  When did you first notice your pain? months   Have you seen this provider for your pain in the past? Yes   Where in your body do " "you have pain? Low back Right and Left side  Are you seeing anyone else for your pain? Yes - Physical Therapy [facility]        9/22/2022     9:01 AM 3/23/2023    10:18 AM 1/3/2024     5:12 PM   PHQ-9 SCORE   PHQ-9 Total Score MyChart   5 (Mild depression)   PHQ-9 Total Score 6 6 5           9/22/2022     9:01 AM 3/23/2023    10:18 AM 1/3/2024     5:13 PM   JOHN-7 SCORE   Total Score   5 (mild anxiety)   Total Score 6 6 5         Chronic Pain Follow Up:  Location of pain: Right and Left Lower back  Analgesia/pain control:    - Recent changes:  P.T. on Wednesday new stretches, over use through out back again. Significant flare started yesterday with it feeling \"like I was being tazed  in my legs and back.\"   - Overall control: Tolerable with discomfort    - Current treatments: Physical Therapy, Chiropractor today, acupuncture  Adherence:     - Do you ever take more pain medicine than prescribed? No    - When did you take your last dose of pain medicine?  None   Adverse effects: No       No loss bowel or bladder control or loss of feeling to rectal or genital area.     PDMP Review       None          Last CSA Agreement:   CSA -- Patient Level:    CSA: None found at the patient level.           Last UDS: N/A        Objective    /87 (BP Location: Right arm, Patient Position: Sitting, Cuff Size: Adult Large)   Pulse 78   Temp 98.6  F (37  C) (Tympanic)   Resp 18   Wt 85 kg (187 lb 4.8 oz)   SpO2 93%   BMI 26.87 kg/m    Body mass index is 26.87 kg/m .    Physical Exam   GENERAL: alert and no distress  RESP: lungs clear to auscultation - no rales, rhonchi or wheezes  CV: regular rate and rhythm, normal S1 S2, no S3 or S4, no murmur, click or rub, no peripheral edema   MS: Increased sit to stand time using desk for support. Bilateral paraspinal muscle tenderness of lumbar region. No bony point tenderness or step off. Straight leg lift positive on right. ROM limited by reported pain bilaterally to about 45 " degrees.     No results found for any visits on 04/30/24.          Signed Electronically by: Estelle Reyna CNP

## 2024-04-29 NOTE — TELEPHONE ENCOUNTER
10:28 AM    Reason for Call: Phone Call    Description: patient is calling and has an existing back injury, collapsed L5 and is in severe pain, 7-8 pain level    Was an appointment offered for this call? No  If yes : Appointment type              Date    Preferred method for responding to this message: Telephone Call  What is your phone number ? [phone]    If we cannot reach you directly, may we leave a detailed response at the number you provided? Yes    Can this message wait until your PCP/provider returns, if available today? YES, No    KATHERINE METCALF

## 2024-04-30 ENCOUNTER — OFFICE VISIT (OUTPATIENT)
Dept: FAMILY MEDICINE | Facility: OTHER | Age: 44
End: 2024-04-30
Attending: NURSE PRACTITIONER
Payer: COMMERCIAL

## 2024-04-30 VITALS
BODY MASS INDEX: 26.87 KG/M2 | DIASTOLIC BLOOD PRESSURE: 87 MMHG | HEART RATE: 78 BPM | OXYGEN SATURATION: 93 % | SYSTOLIC BLOOD PRESSURE: 130 MMHG | WEIGHT: 187.3 LBS | TEMPERATURE: 98.6 F | RESPIRATION RATE: 18 BRPM

## 2024-04-30 DIAGNOSIS — M54.42 CHRONIC BILATERAL LOW BACK PAIN WITH BILATERAL SCIATICA: Primary | ICD-10-CM

## 2024-04-30 DIAGNOSIS — M54.41 CHRONIC BILATERAL LOW BACK PAIN WITH BILATERAL SCIATICA: Primary | ICD-10-CM

## 2024-04-30 DIAGNOSIS — G89.29 CHRONIC BILATERAL LOW BACK PAIN WITH BILATERAL SCIATICA: Primary | ICD-10-CM

## 2024-04-30 PROCEDURE — 99214 OFFICE O/P EST MOD 30 MIN: CPT | Performed by: NURSE PRACTITIONER

## 2024-04-30 RX ORDER — PREDNISONE 20 MG/1
TABLET ORAL
Qty: 20 TABLET | Refills: 0 | Status: SHIPPED | OUTPATIENT
Start: 2024-04-30

## 2024-04-30 ASSESSMENT — ASTHMA QUESTIONNAIRES
QUESTION_3 LAST FOUR WEEKS HOW OFTEN DID YOUR ASTHMA SYMPTOMS (WHEEZING, COUGHING, SHORTNESS OF BREATH, CHEST TIGHTNESS OR PAIN) WAKE YOU UP AT NIGHT OR EARLIER THAN USUAL IN THE MORNING: ONCE OR TWICE
QUESTION_1 LAST FOUR WEEKS HOW MUCH OF THE TIME DID YOUR ASTHMA KEEP YOU FROM GETTING AS MUCH DONE AT WORK, SCHOOL OR AT HOME: NONE OF THE TIME
ACT_TOTALSCORE: 20
QUESTION_5 LAST FOUR WEEKS HOW WOULD YOU RATE YOUR ASTHMA CONTROL: WELL CONTROLLED
QUESTION_4 LAST FOUR WEEKS HOW OFTEN HAVE YOU USED YOUR RESCUE INHALER OR NEBULIZER MEDICATION (SUCH AS ALBUTEROL): TWO OR THREE TIMES PER WEEK
ACT_TOTALSCORE: 20
QUESTION_2 LAST FOUR WEEKS HOW OFTEN HAVE YOU HAD SHORTNESS OF BREATH: ONCE OR TWICE A WEEK

## 2024-04-30 ASSESSMENT — PAIN SCALES - GENERAL: PAINLEVEL: SEVERE PAIN (6)

## 2024-04-30 NOTE — PATIENT INSTRUCTIONS
Start prednisone taper. When complete then start 600 mg ibuprofen every 8 hours.     Okay to schedule a visit with your [facility] primary to discuss further as you are concerned this is related to your  service.    Continue with the tizanidine

## 2024-06-19 ENCOUNTER — MYC REFILL (OUTPATIENT)
Dept: FAMILY MEDICINE | Facility: OTHER | Age: 44
End: 2024-06-19

## 2024-06-19 DIAGNOSIS — J45.40 MODERATE PERSISTENT ASTHMA WITHOUT COMPLICATION: ICD-10-CM

## 2024-06-19 NOTE — TELEPHONE ENCOUNTER
Albuterol (Proair HFA / Proventil HFA / Ventolin HFA) 108 (90 base) MCG / ACT inhaler  Inhale 1 - 2 puffs into the lungs 4 times daily     Last Written Prescription Date:  11-  Last Fill Quantity: 17 g,   # refills: 2  Last Office Visit: 1-4-2024  Future Office visit:

## 2024-06-20 RX ORDER — ALBUTEROL SULFATE 90 UG/1
1-2 AEROSOL, METERED RESPIRATORY (INHALATION) 4 TIMES DAILY
Qty: 17 G | Refills: 4 | Status: SHIPPED | OUTPATIENT
Start: 2024-06-20

## 2024-08-28 ENCOUNTER — TRANSFERRED RECORDS (OUTPATIENT)
Dept: HEALTH INFORMATION MANAGEMENT | Facility: CLINIC | Age: 44
End: 2024-08-28

## 2024-08-30 DIAGNOSIS — G47.00 INSOMNIA, UNSPECIFIED TYPE: ICD-10-CM

## 2024-08-30 NOTE — TELEPHONE ENCOUNTER
CLONAZEPAM 0.5MG TABLET       Last Written Prescription Date:  2/2/24  Last Fill Quantity: 60,   # refills: 2  Last Office Visit: 4/30/24  Future Office visit:       Routing refill request to provider for review/approval because:

## 2024-09-02 RX ORDER — CLONAZEPAM 0.5 MG/1
TABLET ORAL
Qty: 60 TABLET | Refills: 2 | Status: SHIPPED | OUTPATIENT
Start: 2024-09-02

## 2024-10-01 ENCOUNTER — VIRTUAL VISIT (OUTPATIENT)
Dept: PULMONOLOGY | Facility: OTHER | Age: 44
End: 2024-10-01
Attending: FAMILY MEDICINE
Payer: COMMERCIAL

## 2024-10-01 VITALS — WEIGHT: 180 LBS | HEIGHT: 70 IN | BODY MASS INDEX: 25.77 KG/M2

## 2024-10-01 DIAGNOSIS — G47.63 SLEEP-RELATED BRUXISM: ICD-10-CM

## 2024-10-01 DIAGNOSIS — G47.00 INSOMNIA, UNSPECIFIED TYPE: Primary | ICD-10-CM

## 2024-10-01 PROCEDURE — 99214 OFFICE O/P EST MOD 30 MIN: CPT | Mod: 95 | Performed by: FAMILY MEDICINE

## 2024-10-01 PROCEDURE — G2211 COMPLEX E/M VISIT ADD ON: HCPCS | Mod: 95 | Performed by: FAMILY MEDICINE

## 2024-10-01 ASSESSMENT — PAIN SCALES - GENERAL: PAINLEVEL: NO PAIN (0)

## 2024-10-01 NOTE — NURSING NOTE
Current patient location: [address] [address]    Is the patient currently in the state of [place]? YES    Visit mode:VIDEO    If the visit is dropped, the patient can be reconnected by: VIDEO VISIT: Send to e-mail at: [email or web address]    Will anyone else be joining the visit? NO  (If patient encounters technical issues they should call [phone])    Are changes needed to the allergy or medication list? No    Are refills needed on medications prescribed by this physician? NO    Rooming Documentation:  Questionnaire(s) completed    Reason for visit: RECHECK    Josep SAMPSONF

## 2024-10-01 NOTE — PROGRESS NOTES
"Solis Prescott is a 44 year old male who is being evaluated via a billable video visit.       The patient has been notified of following:      \"This video visit will be conducted via a call between you and your physician/provider. We have found that certain health care needs can be provided without the need for an in-person physical exam.  This service lets us provide the care you need with a video conversation.  If a prescription is necessary we can send it directly to your pharmacy.  If lab work is needed we can place an order for that and you can then stop by our lab to have the test done at a later time.     Video visits are billed at different rates depending on your insurance coverage.  Please reach out to your insurance provider with any questions.     If during the course of the call the physician/provider feels a video visit is not appropriate, you will not be charged for this service.\"     Patient has given verbal consent for Video visit? Yes  How would you like to obtain your AVS? Mail a copy  If you are dropped from the video visit, the video invite should be resent to: Text to cell phone: -  Will anyone else be joining your video visit? No  If patient encounters technical issues they should call [phone]      Video-Visit Details     Type of service:  Video Visit     Start Time:  3:30pm  End Time:  3:55pm    Originating Location (pt. Location): Home     Distant Location (provider location):  Off-site, [facility] [place]       Platform used for Video Visit: Schedulize    Virtual visit for follow-up of chronic insomnia and severe nocturnal bruxism.     Assessment / Plan:     1.)    No significant sleep disordered breathing on repeat WatchPAT home sleep testing     2.)  Chronic sleep onset and maintenance insomnia  - Appears stable after consultation and follow-up with Dr. Rojas for CBT-I and clonazepam 0.5-1mg PO at bedtime.  -For now, we agreed to continue the clonazepam 0.5-1 mg at " bedtime as needed, may be having some mild benefit in regards to clenching.  We discussed that this something that may be changed if there is recommendation for medication for his clenching that would interact with the clonazepam.    3.) severe nocturnal bruxism    It appears that the overall plan from the [facility] clinic is to manage with medication.  As above, there is likely some mild benefit from clonazepam, but his recent treatment for vertebral disc herniation that included oral steroids and tizanidine did have improvement in bruxism, with my suspicion for the tizanidine being the helpful agent.    We will start with a trial of tizanidine 2 mg at nighttime and he will give a MyChart update in 1 week.    The longitudinal plan of care for the diagnosis(es)/condition(s) as documented were addressed during this visit. Due to the added complexity in care, I will continue to support Solis in the subsequent management and with ongoing continuity of care.      SUBJECTIVE:  Solis Prescott is a 44 year old male.    Pertinent PMHx: EDWIGE, severe nocturnal clenching / bruxism     Prior Sleep Testin2021 - HST with weight 203 lbs, BMI 29.1.  AHI 6.  AHI was 11.9 per hour supine, - per hour prone, 0.9 per hour on left side, and 0.6 per hour on right side.  Percent of time spent: supine - 46.7%, prone - 0%, on left - 29.5%, on right - 23.3%.  Average oxygen saturation was 92.5%.  Time with saturation less than or equal to 88% was 15.3 minutes, though was present in a singular period of unexplained sustained hypoxemia that I suspect represents artifact. The lowest oxygen saturation was 83%.     Found one visit with [facility] sleep clinic on 2013.  PSG with AHI 3, ramo SpO2 89%, alpha intrusion.  Follow-up visit on 2014 for chronic insomnia, managed with clonazepam 1mg and appearing stable.  I did not have more recent visits for review from another sleep clinic.     2021 -we reviewed his home sleep test  "study in detail, but is also considering transferring care for his chronic insomnia.  He reports a history of insomnia for 10+ years has included difficulty falling asleep and staying asleep.  He previously followed with the [facility] sleep clinic and [place].  Historically, he has been tried on a few different medications including:  Trazodone  Clonazepam-General well-tolerated reportedly taken for approximately 8 years, discontinued for unclear reasons when returning to [place] in May.  Zolpidem-mild benefit, significant morning grogginess, concern given multiple associates with abnormal nocturnal behaviors  Amitriptyline-mild benefit     STOP-BANG score of 3, with unknown neck circumference.  [place] score of 8.  BMI of Estimated body mass index is 29.13 kg/m  as calculated from the following:    Height as of 10/19/21: 1.778 m (5' 10\").    Weight as of 10/19/21: 92.1 kg (203 lb).      Per questionnaire: \"It was recommended by my sleep provider as I still don't sleep well.\"     Caffeine use:  Yes for 3+ per day.  No for within 6 hours of bed.     Tobacco use: Yes     Sleep pattern:  Workdays.  11:30pm - 6:30am, total sleep time 6-7 hours.  Weekends.  2am - 10am, total sleep time 8 hours.  Time to fall asleep: ~30 minutes.  Awakenings: 2-3 times per night, 15 minutes to return to sleep.  Napping.  0 days per week, - hours per nap.     No for RLS screen.  No for sleep walking.  No for dream enactment behavior.  Yes for bruxism.     No for morning headaches.  Yes for snoring.  No for observed apnea.  No for FHx of EDWIGE.     SHx:  , lives with wife and 2 daughters.  Works in IT support.     A/P for restart of clonazepam 0.5-1mg PO at bedtime, referral to sleep psychology with Dr. Rojas, no specific treatment for mild EDWIGE at this time.     2/3/2023 -he presents today to touch base before his upcoming consultation with the [facility] dental clinic in regards to his severe nocturnal clenching.  He " feels that his clenching has worsened over the past 2 years, and became more of a urgent concern around September or October 2022 when he actually cracked a tooth presumed from clenching.  He also has morning jaw soreness, jaw pain.     Last visit with Dr. Rojas on 6/24/2022, overall improvement and plan for follow-up as needed.     He also notes some significant social stressors which is going through divorce, challenges finding a new home for purchase in the current housing market.  He also had increase in chronic sinus issues, poor nasal airflow.  He reports that this led to him having a bilateral nasal turbinate reduction a number of years ago, and he is wondering if this is something that should be reevaluated.  He also reports poor nasal airflow consistently.     A/P to continue clonazepam 0.5-1mg at bedtime, follow-up with Dr. Rojas for CBT-I.     4/3/2023 -we reviewed his home sleep test results in detail.    A/P to continue behavioral mod / CBT-I following consult with Dr. Rojas, clonazepam 0.5-1mg at bedtime.    Today -presents today for follow-up of chronic insomnia, but primarily ongoing severe nocturnal bruxism.  Since our last visit, it sounds like there was a trial of discontinuation of clonazepam and start a trial of clonidine 0.1 mg up to 0.4 mg, this was felt to be less effective than clonazepam and he has restarted clonazepam.  He finds the clonazepam is helpful for decreasing awakenings, possibly mild benefit for bruxism, total sleep time roughly 6 hours.    He has noted ongoing significant medical and social stressors including his recent L5-S1 disc herniation, buying a new home, moving, frequent doctor visits.    We did review that while he was being treated for his disc herniation with a combination of tizanidine 2 mg 3 times daily, oral steroids and naproxen that he did appear to have a significant improvement in his bruxism.    He is also lost some weight, significance reduction in  alcohol use.      Past medical history:    Patient Active Problem List    Diagnosis Date Noted    Anxiety 10/27/2023     Priority: Medium    Gastroesophageal reflux disease with esophagitis without hemorrhage 09/14/2023     Priority: Medium    Nicotine dependence, uncomplicated, unspecified nicotine product type 03/23/2023     Priority: Medium    Alcoholism (H) 07/08/2020     Priority: Medium    Hypertriglyceridemia 07/08/2020     Priority: Medium    Tobacco abuse 07/08/2020     Priority: Medium    Allergic rhinitis 11/23/2012     Priority: Medium    Asthma 11/23/2012     Priority: Medium    Prediabetes 11/23/2012     Priority: Medium    Insomnia 11/23/2012     Priority: Medium       10 point ROS of systems including Constitutional, Eyes, Respiratory, Cardiovascular, Gastroenterology, Genitourinary, Integumentary, Muscularskeletal, Psychiatric were all negative except for pertinent positives noted in my HPI.    Current Outpatient Medications   Medication Sig Dispense Refill    albuterol (PROAIR HFA/PROVENTIL HFA/VENTOLIN HFA) 108 (90 Base) MCG/ACT inhaler Inhale 1-2 puffs into the lungs 4 times daily 17 g 4    budesonide (PULMICORT) 0.5 MG/2ML neb solution Squirt entire vial into isabel med saline solution, mix, and irrigate each nostril until entire bottle empty.  Do this twice daily. 200 mL 11    clonazePAM (KLONOPIN) 0.5 MG tablet TAKE 1-2 TABLETS BY MOUTH 15-30 MINUTES BEFORE BED. 60 tablet 2    fluticasone-salmeterol (ADVAIR DISKUS) 250-50 MCG/ACT inhaler USE 1 INHALATION EVERY 12 HOURS Strength: 250-50 MCG/ACT 3 each 11    montelukast (SINGULAIR) 10 MG tablet Take 1 tablet (10 mg) by mouth at bedtime 90 tablet 2    naproxen (NAPROSYN) 500 MG tablet Take 1 tablet (500 mg) by mouth 2 times daily (with meals) Start after done with prednisone. 60 tablet 0    omeprazole (PRILOSEC) 40 MG DR capsule Take 1 capsule (40 mg) by mouth daily 90 capsule 2    predniSONE (DELTASONE) 20 MG tablet Take 3 tabs by mouth daily x 3  days, then 2 tabs daily x 3 days, then 1 tab daily x 3 days, then 1/2 tab daily x 3 days. 20 tablet 0    tiZANidine (ZANAFLEX) 2 MG tablet Take 1-2 tablets (2-4 mg) by mouth 3 times daily (Patient not taking: Reported on 4/30/2024) 60 tablet 0    triamcinolone (KENALOG) 0.1 % external cream Apply topically 2 times daily To affected areas of legs 45 g 0       OBJECTIVE:  There were no vitals taken for this visit.    Physical Exam     ---  This note was written with the assistance of the Dragon voice-dictation technology software. The final document, although reviewed, may contain errors. For corrections, please contact the office.    Total time spent preparing to see the patient, review of chart, obtaining history and physical examination, review of sleep testing, review of treatment options, education, discussion with patient and documenting in Epic / EMR was 30 minutes.  All time involved was spent on the day of service for the patient (the same day as the patient's appointment).    Kofi Richey MD    Sleep Medicine  [facility]  - [place], [place]  Main Office: [phone]  [facility] [place] [facility] [place], [place]  [address], [address]  Schedule visits: [phone]  Main Office: [phone]  Fax: [phone]

## 2024-10-01 NOTE — PROGRESS NOTES
"Virtual Visit Details    Type of service:  Video Visit     Originating Location (pt. Location): {video visit patient location:097917::\"Home\"}  {PROVIDER LOCATION On-site should be selected for visits conducted from your clinic location or adjoining [facility] hospital, academic office, or other nearby [facility] building. Off-site should be selected for all other provider locations, including home:225617}  Distant Location (provider location):  {virtual location provider:714554}  Platform used for Video Visit: {Virtual Visit Platforms:756303::\"[employer]\"}    "

## 2024-10-24 ENCOUNTER — MYC MEDICAL ADVICE (OUTPATIENT)
Dept: PULMONOLOGY | Facility: OTHER | Age: 44
End: 2024-10-24

## 2024-10-24 DIAGNOSIS — M62.830 BACK MUSCLE SPASM: ICD-10-CM

## 2024-10-30 ENCOUNTER — MYC MEDICAL ADVICE (OUTPATIENT)
Dept: FAMILY MEDICINE | Facility: OTHER | Age: 44
End: 2024-10-30

## 2024-10-30 DIAGNOSIS — J45.40 MODERATE PERSISTENT ASTHMA WITHOUT COMPLICATION: ICD-10-CM

## 2024-10-30 NOTE — LETTER
Solis,   You requested that a list of [facility]. Below is a list of all current asthma related medications currently being prescribed.     1. Moderate persistent asthma without complication  - albuterol (PROAIR HFA/PROVENTIL HFA/VENTOLIN HFA) 108 (90 Base) MCG/ACT inhaler; Inhale 1-2 puffs into the lungs every 4 hours as needed for shortness of breath, wheezing or cough.  Dispense: 17 g; Refill: 0  - fluticasone-salmeterol (ADVAIR DISKUS) 250-50 MCG/ACT inhaler; USE 1 INHALATION EVERY 12 HOURS Strength: 250-50 MCG/ACT  Dispense: 3 each; Refill: 0  - montelukast (SINGULAIR) 10 MG tablet; Take 1 tablet (10 mg) by mouth at bedtime.  Dispense: 90 tablet; Refill: 0        KRIS Solis, CNP

## 2024-10-31 PROBLEM — M25.69 BACK STIFFNESS: Status: ACTIVE | Noted: 2024-04-12

## 2024-10-31 PROBLEM — M54.41 CHRONIC BILATERAL LOW BACK PAIN WITH RIGHT-SIDED SCIATICA: Status: ACTIVE | Noted: 2024-04-12

## 2024-10-31 PROBLEM — G89.29 CHRONIC BILATERAL LOW BACK PAIN WITH RIGHT-SIDED SCIATICA: Status: ACTIVE | Noted: 2024-04-12

## 2024-10-31 PROBLEM — R29.898 DECONDITIONED LOW BACK: Status: ACTIVE | Noted: 2024-04-12

## 2024-10-31 RX ORDER — ALBUTEROL SULFATE 90 UG/1
1-2 INHALANT RESPIRATORY (INHALATION) EVERY 4 HOURS PRN
Qty: 17 G | Refills: 0 | Status: SHIPPED | OUTPATIENT
Start: 2024-10-31

## 2024-10-31 RX ORDER — MONTELUKAST SODIUM 10 MG/1
1 TABLET ORAL AT BEDTIME
Qty: 90 TABLET | Refills: 0 | Status: SHIPPED | OUTPATIENT
Start: 2024-10-31

## 2024-10-31 RX ORDER — FLUTICASONE PROPIONATE AND SALMETEROL 250; 50 UG/1; UG/1
POWDER RESPIRATORY (INHALATION)
Qty: 3 EACH | Refills: 0 | Status: SHIPPED | OUTPATIENT
Start: 2024-10-31

## 2024-10-31 NOTE — TELEPHONE ENCOUNTER
1. Moderate persistent asthma without complication    - albuterol (PROAIR HFA/PROVENTIL HFA/VENTOLIN HFA) 108 (90 Base) MCG/ACT inhaler; Inhale 1-2 puffs into the lungs every 4 hours as needed for shortness of breath, wheezing or cough.  Dispense: 17 g; Refill: 0  - fluticasone-salmeterol (ADVAIR DISKUS) 250-50 MCG/ACT inhaler; USE 1 INHALATION EVERY 12 HOURS Strength: 250-50 MCG/ACT  Dispense: 3 each; Refill: 0  - montelukast (SINGULAIR) 10 MG tablet; Take 1 tablet (10 mg) by mouth at bedtime.  Dispense: 90 tablet; Refill: 0      KRIS Solis, CNP

## 2024-12-18 DIAGNOSIS — K21.9 GASTROESOPHAGEAL REFLUX DISEASE, UNSPECIFIED WHETHER ESOPHAGITIS PRESENT: ICD-10-CM

## 2024-12-18 NOTE — TELEPHONE ENCOUNTER
omeprazole (PRILOSEC) 40 MG DR capsule 90 capsule 2 2/22/2024   Last Office Visit: 1-4-2024     Future Office visit:       Routing refill request to provider for review/approval because:

## 2024-12-19 RX ORDER — OMEPRAZOLE 40 MG/1
40 CAPSULE, DELAYED RELEASE ORAL DAILY
Qty: 90 CAPSULE | Refills: 1 | Status: SHIPPED | OUTPATIENT
Start: 2024-12-19

## 2025-01-24 ENCOUNTER — HOSPITAL ENCOUNTER (OUTPATIENT)
Dept: MRI IMAGING | Facility: HOSPITAL | Age: 45
Discharge: HOME | End: 2025-01-24
Attending: NURSE PRACTITIONER | Admitting: NURSE PRACTITIONER
Payer: COMMERCIAL

## 2025-01-24 DIAGNOSIS — M54.12 RADICULOPATHY, CERVICAL REGION: ICD-10-CM

## 2025-01-24 PROCEDURE — 72141 MRI NECK SPINE W/O DYE: CPT

## 2025-02-21 DIAGNOSIS — G47.00 INSOMNIA, UNSPECIFIED TYPE: ICD-10-CM

## 2025-02-22 RX ORDER — CLONAZEPAM 0.5 MG/1
TABLET ORAL
Qty: 60 TABLET | Refills: 2 | Status: SHIPPED | OUTPATIENT
Start: 2025-02-22

## 2025-03-02 ENCOUNTER — HEALTH MAINTENANCE LETTER (OUTPATIENT)
Age: 45
End: 2025-03-02

## 2025-03-27 SDOH — HEALTH STABILITY: PHYSICAL HEALTH: ON AVERAGE, HOW MANY DAYS PER WEEK DO YOU ENGAGE IN MODERATE TO STRENUOUS EXERCISE (LIKE A BRISK WALK)?: 2 DAYS

## 2025-03-27 SDOH — HEALTH STABILITY: PHYSICAL HEALTH: ON AVERAGE, HOW MANY MINUTES DO YOU ENGAGE IN EXERCISE AT THIS LEVEL?: 20 MIN

## 2025-03-27 ASSESSMENT — PATIENT HEALTH QUESTIONNAIRE - PHQ9: SUM OF ALL RESPONSES TO PHQ QUESTIONS 1-9: 17

## 2025-03-27 ASSESSMENT — SOCIAL DETERMINANTS OF HEALTH (SDOH): HOW OFTEN DO YOU GET TOGETHER WITH FRIENDS OR RELATIVES?: NEVER

## 2025-03-27 ASSESSMENT — ASTHMA QUESTIONNAIRES
QUESTION_2 LAST FOUR WEEKS HOW OFTEN HAVE YOU HAD SHORTNESS OF BREATH: ONCE OR TWICE A WEEK
QUESTION_1 LAST FOUR WEEKS HOW MUCH OF THE TIME DID YOUR ASTHMA KEEP YOU FROM GETTING AS MUCH DONE AT WORK, SCHOOL OR AT HOME: A LITTLE OF THE TIME
QUESTION_4 LAST FOUR WEEKS HOW OFTEN HAVE YOU USED YOUR RESCUE INHALER OR NEBULIZER MEDICATION (SUCH AS ALBUTEROL): ONCE A WEEK OR LESS
QUESTION_3 LAST FOUR WEEKS HOW OFTEN DID YOUR ASTHMA SYMPTOMS (WHEEZING, COUGHING, SHORTNESS OF BREATH, CHEST TIGHTNESS OR PAIN) WAKE YOU UP AT NIGHT OR EARLIER THAN USUAL IN THE MORNING: ONCE OR TWICE
QUESTION_5 LAST FOUR WEEKS HOW WOULD YOU RATE YOUR ASTHMA CONTROL: WELL CONTROLLED
ACT_TOTALSCORE: 20

## 2025-04-04 ENCOUNTER — OFFICE VISIT (OUTPATIENT)
Dept: FAMILY MEDICINE | Facility: OTHER | Age: 45
End: 2025-04-04
Attending: NURSE PRACTITIONER
Payer: COMMERCIAL

## 2025-04-04 VITALS
TEMPERATURE: 97.6 F | HEIGHT: 70 IN | HEART RATE: 70 BPM | SYSTOLIC BLOOD PRESSURE: 112 MMHG | RESPIRATION RATE: 16 BRPM | BODY MASS INDEX: 26.73 KG/M2 | DIASTOLIC BLOOD PRESSURE: 80 MMHG | WEIGHT: 186.7 LBS | OXYGEN SATURATION: 98 %

## 2025-04-04 DIAGNOSIS — K21.9 GASTROESOPHAGEAL REFLUX DISEASE WITHOUT ESOPHAGITIS: ICD-10-CM

## 2025-04-04 DIAGNOSIS — J34.829 NASAL VALVE COLLAPSE: ICD-10-CM

## 2025-04-04 DIAGNOSIS — Z00.00 ENCOUNTER FOR PREVENTATIVE ADULT HEALTH CARE EXAMINATION: Primary | ICD-10-CM

## 2025-04-04 DIAGNOSIS — F10.10 ALCOHOL ABUSE: ICD-10-CM

## 2025-04-04 DIAGNOSIS — Z98.890 HISTORY OF ENDOSCOPIC SINUS SURGERY: ICD-10-CM

## 2025-04-04 PROCEDURE — 1125F AMNT PAIN NOTED PAIN PRSNT: CPT | Performed by: NURSE PRACTITIONER

## 2025-04-04 PROCEDURE — 3074F SYST BP LT 130 MM HG: CPT | Performed by: NURSE PRACTITIONER

## 2025-04-04 PROCEDURE — 3079F DIAST BP 80-89 MM HG: CPT | Performed by: NURSE PRACTITIONER

## 2025-04-04 PROCEDURE — 99396 PREV VISIT EST AGE 40-64: CPT | Performed by: NURSE PRACTITIONER

## 2025-04-04 PROCEDURE — 99213 OFFICE O/P EST LOW 20 MIN: CPT | Mod: 25 | Performed by: NURSE PRACTITIONER

## 2025-04-04 RX ORDER — OMEPRAZOLE 40 MG/1
40 CAPSULE, DELAYED RELEASE ORAL DAILY
Qty: 90 CAPSULE | Refills: 1 | Status: SHIPPED | OUTPATIENT
Start: 2025-04-04

## 2025-04-04 RX ORDER — BUDESONIDE 0.5 MG/2ML
INHALANT ORAL
Qty: 200 ML | Refills: 3 | Status: SHIPPED | OUTPATIENT
Start: 2025-04-04

## 2025-04-04 RX ORDER — FLUTICASONE PROPIONATE AND SALMETEROL XINAFOATE 115; 21 UG/1; UG/1
2 AEROSOL, METERED RESPIRATORY (INHALATION) 2 TIMES DAILY
COMMUNITY

## 2025-04-04 RX ORDER — MONTELUKAST SODIUM 10 MG/1
10 TABLET ORAL AT BEDTIME
COMMUNITY

## 2025-04-04 RX ORDER — ALBUTEROL SULFATE 90 UG/1
2 INHALANT RESPIRATORY (INHALATION) EVERY 6 HOURS PRN
COMMUNITY

## 2025-04-04 SDOH — HEALTH STABILITY: PHYSICAL HEALTH: ON AVERAGE, HOW MANY DAYS PER WEEK DO YOU ENGAGE IN MODERATE TO STRENUOUS EXERCISE (LIKE A BRISK WALK)?: 2 DAYS

## 2025-04-04 SDOH — HEALTH STABILITY: PHYSICAL HEALTH: ON AVERAGE, HOW MANY MINUTES DO YOU ENGAGE IN EXERCISE AT THIS LEVEL?: 20 MIN

## 2025-04-04 ASSESSMENT — PATIENT HEALTH QUESTIONNAIRE - PHQ9
SUM OF ALL RESPONSES TO PHQ QUESTIONS 1-9: 17
SUM OF ALL RESPONSES TO PHQ QUESTIONS 1-9: 17
10. IF YOU CHECKED OFF ANY PROBLEMS, HOW DIFFICULT HAVE THESE PROBLEMS MADE IT FOR YOU TO DO YOUR WORK, TAKE CARE OF THINGS AT HOME, OR GET ALONG WITH OTHER PEOPLE: VERY DIFFICULT

## 2025-04-04 ASSESSMENT — SOCIAL DETERMINANTS OF HEALTH (SDOH): HOW OFTEN DO YOU GET TOGETHER WITH FRIENDS OR RELATIVES?: NEVER

## 2025-04-04 ASSESSMENT — PAIN SCALES - GENERAL: PAINLEVEL_OUTOF10: MODERATE PAIN (5)

## 2025-04-04 NOTE — PROGRESS NOTES
Preventive Care Visit  [facility]  Estelle Reyna, CNP, Family Medicine  Apr 4, 2025  {Provider  Link to SmartSet :390753}    {PROVIDER CHARTING PREFERENCE:264047}    Yamila Ely is a 44 year old, presenting for the following:  Physical        4/4/2025     3:47 PM   Additional Questions   Roomed by Sreedhar Saez lpn   Accompanied by self          HPI  ***   Asthma      3/27/2025     8:10 AM   ACT Total Scores   ACT TOTAL SCORE (Goal Greater than or Equal to 20) 20    In the past 12 months, how many times did you visit the emergency room for your asthma without being admitted to the hospital? 0   In the past 12 months, how many times were you hospitalized overnight because of your asthma? 0       Patient-reported     Do you have any of the following symptoms? Cough and Noisy breathing (wheezing)  What makes your asthma/breathing worse?  Dust mites, Humidity, Strong odors and fumes, Emotions, and Cold air  Do you want more information about how to use your inhaler? No      Chronic/Recurring Back Pain Follow Up  [facility] and treatment plans [facility] with [facility].   Where is your back pain located? (Select all that apply) low back middle  How would you describe your back pain?  dull ache and shooting tingling throbbing  Where does your back pain spread? the right buttock, the right  thigh, the right  knee, and the right foot  Since your last clinic visit for back pain, how has your pain changed? unchanged  Does your back pain interfere with your job? sometimes  Since your last visit, have you tried any new treatment? [facility] and had [facility] injections in January         Advance Care Planning  Patient does not have a Health Care Directive: Discussed advance care planning with patient; however, patient declined at this time.        4/4/2025   General Health   How would you rate your overall physical health? (!) FAIR   Feel stress (tense, anxious, or unable to sleep) Very  much   (!) STRESS CONCERN      4/4/2025   Nutrition   Three or more servings of calcium each day? (!) I DON'T KNOW   Diet: Regular (no restrictions)   How many servings of fruit and vegetables per day? (!) 2-3   How many sweetened beverages each day? (!) 4+         4/4/2025   Exercise   Days per week of moderate/strenous exercise 2 days   Average minutes spent exercising at this level 20 min   (!) EXERCISE CONCERN      4/4/2025   Social Factors   Frequency of gathering with friends or relatives Never   Worry food won't last until get money to buy more No   Food not last or not have enough money for food? No   Do you have housing? (Housing is defined as stable permanent housing and does not include staying ouside in a car, in a tent, in an abandoned building, in an overnight shelter, or couch-surfing.) Yes   Are you worried about losing your housing? No   Lack of transportation? No   Unable to get utilities (heat,electricity)? No   (!) SOCIAL CONNECTIONS CONCERN      4/4/2025   Dental   Dentist two times every year? Yes         Today's PHQ-9 Score:       4/4/2025     8:37 AM   PHQ-9 SCORE   PHQ-9 Total Score MyChart 17 (Moderately severe depression)   PHQ-9 Total Score 17        Patient-reported         4/4/2025   Substance Use   Alcohol more than 3/day or more than 7/wk Yes   How often do you have a drink containing alcohol 2 to 3 times a week   How many alcohol drinks on typical day 5 or 6   How often do you have 5+ drinks at one occasion Weekly   Audit 2/3 Score 5   How often not able to stop drinking once started Never   How often failed to do what normally expected Never   How often needed first drink in am after a heavy drinking session Never   How often feeling of guilt or remorse after drinking Never   How often unable to remember what happened the night before Never   Have you or someone else been injured because of your drinking No   Has anyone been concerned or suggested you cut down on drinking Yes, but  "not in the last year   TOTAL SCORE - AUDIT 10   Do you use any other substances recreationally? (!) CANNABIS PRODUCTS     Social History     Tobacco Use    Smoking status: Every Day     Current packs/day: 0.50     Average packs/day: 0.5 packs/day for 26.0 years (13.0 ttl pk-yrs)     Types: Cigarettes, Other     Passive exposure: Current    Smokeless tobacco: Never    Tobacco comments:     [facility]   Vaping Use    Vaping status: Never Used   Substance Use Topics    Alcohol use: Yes     Alcohol/week: 6.0 standard drinks of alcohol     Types: 6 Cans of beer per week     Comment: weekly    Drug use: Never     {Provider  If there are gaps in the social history shown above, please follow the link to update and then refresh the note Link to Social and Substance History :864104}      4/4/2025   STI Screening   New sexual partner(s) since last STI/HIV test? No         ASCVD Risk   The 10-year ASCVD risk score (Sandra RIOS, et al., 2019) is: 3.5%    Values used to calculate the score:      Age: 44 years      Sex: Male      Is Non- : No      Diabetic: No      Tobacco smoker: Yes      Systolic Blood Pressure: 112 mmHg      Is BP treated: No      HDL Cholesterol: 49 mg/dL      Total Cholesterol: 175 mg/dL        {Provider  REQUIRED FOR AWV Use the storyboard to review patient history, after sections have been marked as reviewed, refresh note to capture documentation:145229}   Reviewed and updated as needed this visit by Provider                    {HISTORY OPTIONS (Optional):711613}    {ROS Picklists (Optional):800183}     Objective    Exam  /80 (BP Location: Left arm, Patient Position: Sitting, Cuff Size: Adult Regular)   Pulse 70   Temp 97.6  F (36.4  C) (Tympanic)   Resp 16   Ht 1.778 m (5' 10\")   Wt 84.7 kg (186 lb 11.2 oz)   SpO2 98%   BMI 26.79 kg/m     Estimated body mass index is 26.79 kg/m  as calculated from the following:    Height as of this encounter: 1.778 m (5' " "10\").    Weight as of this encounter: 84.7 kg (186 lb 11.2 oz).    Physical Exam  {Exam Choices (Optional):735037}        Signed Electronically by: Estelle Reyna CNP  {Email feedback regarding this note to [email or web address]   :746537}  Answers submitted by the patient for this visit:  Patient Health Questionnaire (Submitted on 4/4/2025)  If you checked off any problems, how difficult have these problems made it for you to do your work, take care of things at home, or get along with other people?: Very difficult  PHQ9 TOTAL SCORE: 17    " peripheral edema  ABDOMEN: soft, nontender, no hepatosplenomegaly, no masses and bowel sounds normal  MS: extremities normal- no gross deformities noted. No acute findings  SKIN: no suspicious lesions or rashes  NEURO: Normal strength and tone, mentation intact and speech normal  PSYCH: mentation appears normal, affect normal/bright        Signed Electronically by: Estelle Reyna CNP    Answers submitted by the patient for this visit:  Patient Health Questionnaire (Submitted on 4/4/2025)  If you checked off any problems, how difficult have these problems made it for you to do your work, take care of things at home, or get along with other people?: Very difficult  PHQ9 TOTAL SCORE: 17

## 2025-04-13 PROBLEM — F10.10 ALCOHOL ABUSE: Status: ACTIVE | Noted: 2020-07-08

## 2025-07-22 ENCOUNTER — VIRTUAL VISIT (OUTPATIENT)
Dept: PULMONOLOGY | Facility: OTHER | Age: 45
End: 2025-07-22
Attending: FAMILY MEDICINE
Payer: COMMERCIAL

## 2025-07-22 VITALS — BODY MASS INDEX: 26.48 KG/M2 | HEIGHT: 70 IN | WEIGHT: 185 LBS

## 2025-07-22 DIAGNOSIS — G47.63 SLEEP-RELATED BRUXISM: ICD-10-CM

## 2025-07-22 DIAGNOSIS — G47.00 INSOMNIA, UNSPECIFIED TYPE: Primary | ICD-10-CM

## 2025-07-22 DIAGNOSIS — F43.10 PTSD (POST-TRAUMATIC STRESS DISORDER): ICD-10-CM

## 2025-07-22 NOTE — PROGRESS NOTES
"Solis Prescott is a 45 year old male who is being evaluated via a billable video visit.       The patient has been notified of following:      \"This video visit will be conducted via a call between you and your physician/provider. We have found that certain health care needs can be provided without the need for an in-person physical exam.  This service lets us provide the care you need with a video conversation.  If a prescription is necessary we can send it directly to your pharmacy.  If lab work is needed we can place an order for that and you can then stop by our lab to have the test done at a later time.     Video visits are billed at different rates depending on your insurance coverage.  Please reach out to your insurance provider with any questions.     If during the course of the call the physician/provider feels a video visit is not appropriate, you will not be charged for this service.\"     Patient has given verbal consent for Video visit? Yes  How would you like to obtain your AVS? Mail a copy  If you are dropped from the video visit, the video invite should be resent to: Text to cell phone: -  Will anyone else be joining your video visit? No  If patient encounters technical issues they should call [phone]      Video-Visit Details     Type of service:  Video Visit     Start Time: 1:30pm  End Time: 2pm    Originating Location (pt. Location): Home     Distant Location (provider location):  [facility]       Platform used for Video Visit: BrandBacker    Virtual visit for follow-up.     Assessment / Plan:     1.)  No significant sleep disordered breathing on repeat WatchPAT home sleep testing     2.)  Chronic sleep onset and maintenance insomnia    Prior consultation and follow-up with Dr. Rojas for CBT-I and clonazepam 0.5-1mg PO at bedtime (also being prescribed for severe bruxism).    I feel that it is difficult to differentiate his insomnia from chronic fatigue concerns, as " well as significant mental illness.     3.) severe nocturnal bruxism     It appears that the overall plan from the [facility] clinic is to manage with medication.  As above, there is likely some mild benefit from clonazepam and tizanidine.    4.)  Chronic daytime fatigue    Our plan for today:  Plan to carefully continue current clonazepam 0.5-1 mg 15-30 minutes before bed, tizanidine 4 mg 3 times daily as needed.  Plan to proceed with diagnostic in-lab PSG with 4 limb EMG.    Recommended Sleep Testing/Procedures:    Adult, PSG/Diagnostic (Bed 2 or Bed 1), and 4-limb EMG      SUBJECTIVE:  Solis Prescott is a 45 year old male.    Pertinent PMHx: EDWIGE, severe nocturnal clenching / bruxism, PTSD     Prior Sleep Testin2021 - HST with weight 203 lbs, BMI 29.1.  AHI 6.  AHI was 11.9 per hour supine, - per hour prone, 0.9 per hour on left side, and 0.6 per hour on right side.  Percent of time spent: supine - 46.7%, prone - 0%, on left - 29.5%, on right - 23.3%.  Average oxygen saturation was 92.5%.  Time with saturation less than or equal to 88% was 15.3 minutes, though was present in a singular period of unexplained sustained hypoxemia that I suspect represents artifact. The lowest oxygen saturation was 83%.     Found one visit with [facility] on 2013.  PSG with AHI 3, ramo SpO2 89%, alpha intrusion.  Follow-up visit on 2014 for chronic insomnia, managed with clonazepam 1mg and appearing stable.  I did not have more recent visits for review from another sleep clinic.     2021 -we reviewed his home sleep test study in detail, but is also considering transferring care for his chronic insomnia.  He reports a history of insomnia for 10+ years has included difficulty falling asleep and staying asleep.  He previously followed with the [facility] sleep clinic and [place].  Historically, he has been tried on a few different medications including:  Trazodone  Clonazepam-General well-tolerated  "reportedly taken for approximately 8 years, discontinued for unclear reasons when returning to [place] in May.  Zolpidem-mild benefit, significant morning grogginess, concern given multiple associates with abnormal nocturnal behaviors  Amitriptyline-mild benefit     STOP-BANG score of 3, with unknown neck circumference.  [place] score of 8.  BMI of Estimated body mass index is 29.13 kg/m  as calculated from the following:    Height as of 10/19/21: 1.778 m (5' 10\").    Weight as of 10/19/21: 92.1 kg (203 lb).      Per questionnaire: \"It was recommended by my sleep provider as I still don't sleep well.\"     Caffeine use:  Yes for 3+ per day.  No for within 6 hours of bed.     Tobacco use: Yes     Sleep pattern:  Workdays.  11:30pm - 6:30am, total sleep time 6-7 hours.  Weekends.  2am - 10am, total sleep time 8 hours.  Time to fall asleep: ~30 minutes.  Awakenings: 2-3 times per night, 15 minutes to return to sleep.  Napping.  0 days per week, - hours per nap.     No for RLS screen.  No for sleep walking.  No for dream enactment behavior.  Yes for bruxism.     No for morning headaches.  Yes for snoring.  No for observed apnea.  No for FHx of EDWIGE.     SHx:  , lives with wife and 2 daughters.  Works in IT support.     A/P for restart of clonazepam 0.5-1mg PO at bedtime, referral to sleep psychology with Dr. Rojas, no specific treatment for mild EDWIGE at this time.     2/3/2023 -he presents today to [facility] before his upcoming consultation with the [facility] in regards to his severe nocturnal clenching.  He feels that his clenching has worsened over the past 2 years, and became more of a urgent concern around September or October 2022 when he actually cracked a tooth presumed from clenching.  He also has morning jaw soreness, jaw pain.     Last visit with Dr. Rojas on 6/24/2022, overall improvement and plan for follow-up as needed.     He also notes some significant social " stressors which is going through divorce, challenges finding a new home for purchase in the current housing market.  He also had increase in chronic sinus issues, poor nasal airflow.  He reports that this led to him having a bilateral nasal turbinate reduction a number of years ago, and he is wondering if this is something that should be reevaluated.  He also reports poor nasal airflow consistently.     A/P to continue clonazepam 0.5-1mg at bedtime, follow-up with Dr. Rojas for CBT-I.     4/3/2023 -we reviewed his home sleep test results in detail.     A/P to continue behavioral mod / CBT-I following consult with Dr. Rojas, clonazepam 0.5-1mg at bedtime.     10/1/2024 -presents today for follow-up of chronic insomnia, but primarily ongoing severe nocturnal bruxism.  Since our last visit, it sounds like there was a trial of discontinuation of clonazepam and start a trial of clonidine 0.1 mg up to 0.4 mg, this was felt to be less effective than clonazepam and he has restarted clonazepam.  He finds the clonazepam is helpful for decreasing awakenings, possibly mild benefit for bruxism, total sleep time roughly 6 hours.     He has noted ongoing significant medical and social stressors including his recent L5-S1 disc herniation, buying a new home, moving, frequent doctor visits.     We did review that while he was being treated for his disc herniation with a combination of tizanidine 2 mg 3 times daily, oral steroids and naproxen that he did appear to have a significant improvement in his bruxism.     He is also lost some weight, significance reduction in alcohol use.    A/P to continue clonazepam 0.5-1 mg at bedtime as needed, follow-up as recommended with Dr. Rojas, trial of tizanidine 2 mg at bedtime for severe nocturnal bruxism.    Today - [facility] for [facility].  The primary goal for today is to arrange a diagnostic in-lab sleep study.  He notes that there is a large amount of history  that is changed since our last visit and I will try to summarize to the best of my ability below, though history was somewhat tangential.    He reports that per his therapist there is concerns for recurrent vivid dreams disrupting sleep, he denies these as being nightmares but are dreams of conflict and needs to solve problems.  This led to a start an uptitration of prazosin currently at 12 mg.  It seems to be unclear for benefit here.  For severe bruxism, he has continue tizanidine 4 mg and clonazepam 1 mg at bedtime.  He also states that he had some inpatient treatment for a combined psych and alcohol treatment.  Primary psychiatrist who he reports is PTSD.  He also notes some chronic nasal and sinus issues, is working with the ENT, currently managed with sinus flushes, Nasacort, nasal dilators.  He does feel that his bruxism is also worsening, he states there has been a second cosmetic damage to a tooth presume from clenching, this is a molar second on the right lower side.    He feels that his main sleep issue is poor sleep quality and this is what leads to him feeling very tired during the day    Past medical history:    Patient Active Problem List    Diagnosis Date Noted    Back stiffness 04/12/2024     Priority: Medium    Deconditioned low back 04/12/2024     Priority: Medium    Chronic bilateral low back pain with right-sided sciatica 04/12/2024     Priority: Medium    Anxiety 10/27/2023     Priority: Medium    Gastroesophageal reflux disease with esophagitis without hemorrhage 09/14/2023     Priority: Medium    Nicotine dependence, uncomplicated, unspecified nicotine product type 03/23/2023     Priority: Medium    Alcohol abuse 07/08/2020     Priority: Medium    Hypertriglyceridemia 07/08/2020     Priority: Medium    Tobacco abuse 07/08/2020     Priority: Medium    Allergic rhinitis 11/23/2012     Priority: Medium    Asthma 11/23/2012     Priority: Medium    Prediabetes 11/23/2012     Priority: Medium     Insomnia 11/23/2012     Priority: Medium       10 point ROS of systems including Constitutional, Eyes, Respiratory, Cardiovascular, Gastroenterology, Genitourinary, Integumentary, Muscularskeletal, Psychiatric were all negative except for pertinent positives noted in my HPI.    Current Outpatient Medications   Medication Sig Dispense Refill    albuterol (PROAIR HFA/PROVENTIL HFA/VENTOLIN HFA) 108 (90 Base) MCG/ACT inhaler Inhale 2 puffs into the lungs every 6 hours as needed for shortness of breath, wheezing or cough. [facility]      budesonide (PULMICORT) 0.5 MG/2ML neb solution MIX VIAL INTO NEILMED SALINE SOLUTION AND IRRIGATE NOSTRILS WITH WHOLE BOTTLE.DO TWICE DAILY 200 mL 3    clonazePAM (KLONOPIN) 0.5 MG tablet Take 1-2 tablets by mouth 15-30 minutes before bed. 60 tablet 2    fluticasone-salmeterol (ADVAIR HFA) 115-21 MCG/ACT inhaler Inhale 2 puffs into the lungs 2 times daily. Through [facility]      montelukast (SINGULAIR) 10 MG tablet Take 10 mg by mouth at bedtime. Through [facility]      omeprazole (PRILOSEC) 40 MG DR capsule Take 1 capsule (40 mg) by mouth daily. 90 capsule 1    tiZANidine (ZANAFLEX) 4 MG tablet Take 1 tablet (4 mg) by mouth 3 times daily as needed for muscle spasms. 90 tablet 1    triamcinolone (KENALOG) 0.1 % external cream Apply topically 2 times daily To affected areas of legs 45 g 0       OBJECTIVE:  There were no vitals taken for this visit.    Physical Exam     ---  This note was written with the assistance of the Dragon voice-dictation technology software. The final document, although reviewed, may contain errors. For corrections, please contact the office.    Total time spent preparing to see the patient, review of chart, obtaining history and physical examination, review of sleep testing, review of treatment options, education, discussion with patient and documenting in Epic / EMR was 30 minutes.  All time involved was spent on the day of service for the patient (the same day as the  patient's appointment).    Kofi Richey MD    Sleep Medicine  [facility]  - [place], [place]  Main Office: [phone]  [facility] - [address], [address]  Schedule visits: [phone]  Main Office: [phone]  Fax: [phone]

## 2025-07-22 NOTE — NURSING NOTE
Current patient location: [address] [address]    Is the patient currently in the state of [place]? YES    Visit mode: VIDEO    If the visit is dropped, the patient can be reconnected by:VIDEO VISIT: Send to e-mail at: [email or web address]    Will anyone else be joining the visit? NO  (If patient encounters technical issues they should call [phone] :[phone])    Are changes needed to the allergy or medication list? No    Are refills needed on medications prescribed by this physician? NO    Rooming Documentation:  Questionnaire(s) completed    Reason for visit: RECHECK    Josep SAMPSONF

## 2025-07-28 DIAGNOSIS — F51.04 INSOMNIA, PSYCHOPHYSIOLOGICAL: Primary | ICD-10-CM

## 2025-08-14 ENCOUNTER — THERAPY VISIT (OUTPATIENT)
Dept: SLEEP MEDICINE | Facility: HOSPITAL | Age: 45
End: 2025-08-14
Attending: FAMILY MEDICINE
Payer: COMMERCIAL

## 2025-08-14 DIAGNOSIS — F51.04 INSOMNIA, PSYCHOPHYSIOLOGICAL: ICD-10-CM

## 2025-08-14 PROCEDURE — 95810 POLYSOM 6/> YRS 4/> PARAM: CPT

## 2025-08-26 ENCOUNTER — VIRTUAL VISIT (OUTPATIENT)
Dept: PULMONOLOGY | Facility: OTHER | Age: 45
End: 2025-08-26
Attending: FAMILY MEDICINE
Payer: COMMERCIAL

## 2025-08-26 VITALS — BODY MASS INDEX: 26.48 KG/M2 | HEIGHT: 70 IN | WEIGHT: 185 LBS

## 2025-08-26 DIAGNOSIS — R00.1 SINUS BRADYCARDIA: ICD-10-CM

## 2025-08-26 DIAGNOSIS — G47.00 INSOMNIA, UNSPECIFIED TYPE: Primary | ICD-10-CM

## 2025-08-26 DIAGNOSIS — G47.63 SLEEP-RELATED BRUXISM: ICD-10-CM

## 2025-08-26 DIAGNOSIS — F43.10 PTSD (POST-TRAUMATIC STRESS DISORDER): ICD-10-CM

## 2025-08-26 DIAGNOSIS — G47.33 OSA (OBSTRUCTIVE SLEEP APNEA): ICD-10-CM

## 2025-08-26 ASSESSMENT — PAIN SCALES - GENERAL: PAINLEVEL_OUTOF10: MODERATE PAIN (6)

## (undated) DEVICE — IRRIGATION-H2O 1000ML

## (undated) DEVICE — FORCEP-COLON BIOPSY STD W/NEEDLE 160CM

## (undated) DEVICE — CANISTER-SUCTION 2000CC

## (undated) DEVICE — SYRINGE-30CC SLIP TIP

## (undated) DEVICE — LUBRICANT JELLY 2OZ. TUBE

## (undated) DEVICE — MOUTHPIECE W/GUARD FOR ENDOSCOPY

## (undated) DEVICE — CONNECTOR-ERBEFLO 2 PORT

## (undated) DEVICE — TUBING-SUCTION 20FT

## (undated) RX ORDER — PROPOFOL 10 MG/ML
INJECTION, EMULSION INTRAVENOUS
Status: DISPENSED
Start: 2021-12-16

## (undated) RX ORDER — LIDOCAINE HYDROCHLORIDE 20 MG/ML
INJECTION, SOLUTION EPIDURAL; INFILTRATION; INTRACAUDAL; PERINEURAL
Status: DISPENSED
Start: 2021-12-16